# Patient Record
Sex: FEMALE | Race: WHITE | NOT HISPANIC OR LATINO | Employment: OTHER | URBAN - METROPOLITAN AREA
[De-identification: names, ages, dates, MRNs, and addresses within clinical notes are randomized per-mention and may not be internally consistent; named-entity substitution may affect disease eponyms.]

---

## 2017-01-05 ENCOUNTER — ALLSCRIPTS OFFICE VISIT (OUTPATIENT)
Dept: OTHER | Facility: OTHER | Age: 82
End: 2017-01-05

## 2017-01-12 ENCOUNTER — LAB CONVERSION - ENCOUNTER (OUTPATIENT)
Dept: OTHER | Facility: OTHER | Age: 82
End: 2017-01-12

## 2017-01-12 ENCOUNTER — GENERIC CONVERSION - ENCOUNTER (OUTPATIENT)
Dept: OTHER | Facility: OTHER | Age: 82
End: 2017-01-12

## 2017-01-12 LAB
INR PPP: 2.2
PROTHROMBIN TIME: 21.8 SEC (ref 9–11.5)

## 2017-02-03 ENCOUNTER — LAB CONVERSION - ENCOUNTER (OUTPATIENT)
Dept: OTHER | Facility: OTHER | Age: 82
End: 2017-02-03

## 2017-02-03 LAB
25(OH)D3 SERPL-MCNC: 40 NG/ML (ref 30–100)
TSH SERPL DL<=0.05 MIU/L-ACNC: 5.56 MIU/L (ref 0.4–4.5)

## 2017-02-06 ENCOUNTER — GENERIC CONVERSION - ENCOUNTER (OUTPATIENT)
Dept: OTHER | Facility: OTHER | Age: 82
End: 2017-02-06

## 2017-02-08 ENCOUNTER — GENERIC CONVERSION - ENCOUNTER (OUTPATIENT)
Dept: OTHER | Facility: OTHER | Age: 82
End: 2017-02-08

## 2017-02-09 ENCOUNTER — GENERIC CONVERSION - ENCOUNTER (OUTPATIENT)
Dept: OTHER | Facility: OTHER | Age: 82
End: 2017-02-09

## 2017-02-09 ENCOUNTER — LAB CONVERSION - ENCOUNTER (OUTPATIENT)
Dept: OTHER | Facility: OTHER | Age: 82
End: 2017-02-09

## 2017-02-09 LAB
INR PPP: 1.9
PROTHROMBIN TIME: 18.9 SEC (ref 9–11.5)

## 2017-02-13 DIAGNOSIS — J20.9 ACUTE BRONCHITIS: ICD-10-CM

## 2017-03-14 ENCOUNTER — LAB CONVERSION - ENCOUNTER (OUTPATIENT)
Dept: OTHER | Facility: OTHER | Age: 82
End: 2017-03-14

## 2017-03-14 ENCOUNTER — GENERIC CONVERSION - ENCOUNTER (OUTPATIENT)
Dept: OTHER | Facility: OTHER | Age: 82
End: 2017-03-14

## 2017-03-14 LAB
INR PPP: 2.4
PROTHROMBIN TIME: 24.2 SEC (ref 9–11.5)

## 2017-03-16 ENCOUNTER — ALLSCRIPTS OFFICE VISIT (OUTPATIENT)
Dept: OTHER | Facility: OTHER | Age: 82
End: 2017-03-16

## 2017-04-02 ENCOUNTER — HOSPITAL ENCOUNTER (OUTPATIENT)
Dept: RADIOLOGY | Facility: MEDICAL CENTER | Age: 82
Discharge: HOME | End: 2017-04-02
Attending: PHYSICIAN ASSISTANT | Admitting: FAMILY MEDICINE
Payer: COMMERCIAL

## 2017-04-02 ENCOUNTER — TRANSCRIBE ORDERS (OUTPATIENT)
Dept: URGENT CARE | Facility: MEDICAL CENTER | Age: 82
End: 2017-04-02

## 2017-04-02 ENCOUNTER — OFFICE VISIT (OUTPATIENT)
Dept: URGENT CARE | Facility: MEDICAL CENTER | Age: 82
End: 2017-04-02
Payer: COMMERCIAL

## 2017-04-02 DIAGNOSIS — R05.9 COUGH: ICD-10-CM

## 2017-04-02 PROCEDURE — 71020 HB CHEST X-RAY 2VW FRONTAL&LATL: CPT

## 2017-04-02 PROCEDURE — 99203 OFFICE O/P NEW LOW 30 MIN: CPT

## 2017-04-04 ENCOUNTER — ALLSCRIPTS OFFICE VISIT (OUTPATIENT)
Dept: OTHER | Facility: OTHER | Age: 82
End: 2017-04-04

## 2017-04-11 ENCOUNTER — GENERIC CONVERSION - ENCOUNTER (OUTPATIENT)
Dept: OTHER | Facility: OTHER | Age: 82
End: 2017-04-11

## 2017-04-11 ENCOUNTER — LAB CONVERSION - ENCOUNTER (OUTPATIENT)
Dept: OTHER | Facility: OTHER | Age: 82
End: 2017-04-11

## 2017-04-11 LAB
A/G RATIO (HISTORICAL): 1.5 (CALC) (ref 1–2.5)
ALBUMIN SERPL BCP-MCNC: 3.9 G/DL (ref 3.6–5.1)
ALP SERPL-CCNC: 32 U/L (ref 33–130)
ALT SERPL W P-5'-P-CCNC: 15 U/L (ref 6–29)
AST SERPL W P-5'-P-CCNC: 21 U/L (ref 10–35)
BASOPHILS # BLD AUTO: 0.5 %
BASOPHILS # BLD AUTO: 30 CELLS/UL (ref 0–200)
BILIRUB SERPL-MCNC: 0.6 MG/DL (ref 0.2–1.2)
BUN SERPL-MCNC: 24 MG/DL (ref 7–25)
BUN/CREA RATIO (HISTORICAL): 25 (CALC) (ref 6–22)
CALCIUM SERPL-MCNC: 9.3 MG/DL (ref 8.6–10.4)
CHLORIDE SERPL-SCNC: 101 MMOL/L (ref 98–110)
CHOLEST SERPL-MCNC: 123 MG/DL (ref 125–200)
CHOLEST/HDLC SERPL: 2.9 (CALC)
CO2 SERPL-SCNC: 32 MMOL/L (ref 20–31)
CREAT SERPL-MCNC: 0.97 MG/DL (ref 0.6–0.88)
DEPRECATED RDW RBC AUTO: 15.1 % (ref 11–15)
EGFR AFRICAN AMERICAN (HISTORICAL): 61 ML/MIN/1.73M2
EGFR-AMERICAN CALC (HISTORICAL): 53 ML/MIN/1.73M2
EOSINOPHIL # BLD AUTO: 1.3 %
EOSINOPHIL # BLD AUTO: 78 CELLS/UL (ref 15–500)
GAMMA GLOBULIN (HISTORICAL): 2.6 G/DL (CALC) (ref 1.9–3.7)
GLUCOSE (HISTORICAL): 93 MG/DL (ref 65–99)
HBA1C MFR BLD HPLC: 6.1 % OF TOTAL HGB
HCT VFR BLD AUTO: 46.5 % (ref 35–45)
HDLC SERPL-MCNC: 43 MG/DL
HGB BLD-MCNC: 15.1 G/DL (ref 11.7–15.5)
INR PPP: 2.4
LDL CHOLESTEROL (HISTORICAL): 62 MG/DL (CALC)
LYMPHOCYTES # BLD AUTO: 1488 CELLS/UL (ref 850–3900)
LYMPHOCYTES # BLD AUTO: 24.8 %
MCH RBC QN AUTO: 32.1 PG (ref 27–33)
MCHC RBC AUTO-ENTMCNC: 32.4 G/DL (ref 32–36)
MCV RBC AUTO: 99 FL (ref 80–100)
MONOCYTES # BLD AUTO: 432 CELLS/UL (ref 200–950)
MONOCYTES (HISTORICAL): 7.2 %
NEUTROPHILS # BLD AUTO: 3972 CELLS/UL (ref 1500–7800)
NEUTROPHILS # BLD AUTO: 66.2 %
NON-HDL-CHOL (CHOL-HDL) (HISTORICAL): 80 MG/DL (CALC)
PLATELET # BLD AUTO: 366 THOUSAND/UL (ref 140–400)
PMV BLD AUTO: 8.2 FL (ref 7.5–12.5)
POTASSIUM SERPL-SCNC: 5.3 MMOL/L (ref 3.5–5.3)
PROTHROMBIN TIME: 25 SEC (ref 9–11.5)
RBC # BLD AUTO: 4.7 MILLION/UL (ref 3.8–5.1)
SODIUM SERPL-SCNC: 140 MMOL/L (ref 135–146)
TOTAL PROTEIN (HISTORICAL): 6.5 G/DL (ref 6.1–8.1)
TRIGL SERPL-MCNC: 88 MG/DL
WBC # BLD AUTO: 6 THOUSAND/UL (ref 3.8–10.8)

## 2017-04-17 ENCOUNTER — GENERIC CONVERSION - ENCOUNTER (OUTPATIENT)
Dept: OTHER | Facility: OTHER | Age: 82
End: 2017-04-17

## 2017-04-24 ENCOUNTER — ALLSCRIPTS OFFICE VISIT (OUTPATIENT)
Dept: OTHER | Facility: OTHER | Age: 82
End: 2017-04-24

## 2017-05-11 ENCOUNTER — GENERIC CONVERSION - ENCOUNTER (OUTPATIENT)
Dept: OTHER | Facility: OTHER | Age: 82
End: 2017-05-11

## 2017-05-11 ENCOUNTER — LAB CONVERSION - ENCOUNTER (OUTPATIENT)
Dept: OTHER | Facility: OTHER | Age: 82
End: 2017-05-11

## 2017-05-11 LAB
INR PPP: 1.3
PROTHROMBIN TIME: 13.6 SEC (ref 9–11.5)

## 2017-05-19 ENCOUNTER — GENERIC CONVERSION - ENCOUNTER (OUTPATIENT)
Dept: OTHER | Facility: OTHER | Age: 82
End: 2017-05-19

## 2017-05-19 ENCOUNTER — LAB CONVERSION - ENCOUNTER (OUTPATIENT)
Dept: OTHER | Facility: OTHER | Age: 82
End: 2017-05-19

## 2017-05-19 LAB
INR PPP: 1.5
PROTHROMBIN TIME: 15.1 SEC (ref 9–11.5)

## 2017-05-23 ENCOUNTER — ALLSCRIPTS OFFICE VISIT (OUTPATIENT)
Dept: OTHER | Facility: OTHER | Age: 82
End: 2017-05-23

## 2017-06-01 ENCOUNTER — GENERIC CONVERSION - ENCOUNTER (OUTPATIENT)
Dept: OTHER | Facility: OTHER | Age: 82
End: 2017-06-01

## 2017-06-01 ENCOUNTER — LAB CONVERSION - ENCOUNTER (OUTPATIENT)
Dept: OTHER | Facility: OTHER | Age: 82
End: 2017-06-01

## 2017-06-01 LAB
INR PPP: 1.9
PROTHROMBIN TIME: 19 SEC (ref 9–11.5)

## 2017-06-07 ENCOUNTER — ALLSCRIPTS OFFICE VISIT (OUTPATIENT)
Dept: OTHER | Facility: OTHER | Age: 82
End: 2017-06-07

## 2017-06-07 ENCOUNTER — LAB CONVERSION - ENCOUNTER (OUTPATIENT)
Dept: OTHER | Facility: OTHER | Age: 82
End: 2017-06-07

## 2017-06-07 LAB
A/G RATIO (HISTORICAL): 1.7 (CALC) (ref 1–2.5)
ALBUMIN SERPL BCP-MCNC: 4.1 G/DL (ref 3.6–5.1)
ALP SERPL-CCNC: 47 U/L (ref 33–130)
ALT SERPL W P-5'-P-CCNC: 18 U/L (ref 6–29)
AST SERPL W P-5'-P-CCNC: 22 U/L (ref 10–35)
BILIRUB SERPL-MCNC: 0.6 MG/DL (ref 0.2–1.2)
BILIRUBIN DIRECT (HISTORICAL): 0.2 MG/DL
CHOLEST SERPL-MCNC: 136 MG/DL (ref 125–200)
CHOLEST/HDLC SERPL: 2.3 (CALC)
CK SERPL-CCNC: 69 U/L (ref 29–143)
GAMMA GLOBULIN (HISTORICAL): 2.4 G/DL (CALC) (ref 1.9–3.7)
HDLC SERPL-MCNC: 59 MG/DL
INDIRECT BILIRUBIN (HISTORICAL): 0.4 MG/DL (CALC) (ref 0.2–1.2)
LDL CHOLESTEROL (HISTORICAL): 61 MG/DL (CALC)
NON-HDL-CHOL (CHOL-HDL) (HISTORICAL): 77 MG/DL (CALC)
TOTAL PROTEIN (HISTORICAL): 6.5 G/DL (ref 6.1–8.1)
TRIGL SERPL-MCNC: 78 MG/DL

## 2017-06-15 ENCOUNTER — LAB CONVERSION - ENCOUNTER (OUTPATIENT)
Dept: OTHER | Facility: OTHER | Age: 82
End: 2017-06-15

## 2017-06-15 ENCOUNTER — GENERIC CONVERSION - ENCOUNTER (OUTPATIENT)
Dept: OTHER | Facility: OTHER | Age: 82
End: 2017-06-15

## 2017-06-15 LAB
INR PPP: 2.2
PROTHROMBIN TIME: 22.9 SEC (ref 9–11.5)

## 2017-07-12 ENCOUNTER — GENERIC CONVERSION - ENCOUNTER (OUTPATIENT)
Dept: OTHER | Facility: OTHER | Age: 82
End: 2017-07-12

## 2017-07-13 ENCOUNTER — GENERIC CONVERSION - ENCOUNTER (OUTPATIENT)
Dept: OTHER | Facility: OTHER | Age: 82
End: 2017-07-13

## 2017-07-13 ENCOUNTER — LAB CONVERSION - ENCOUNTER (OUTPATIENT)
Dept: OTHER | Facility: OTHER | Age: 82
End: 2017-07-13

## 2017-07-13 LAB
INR PPP: 3
PROTHROMBIN TIME: 30.9 SEC (ref 9–11.5)

## 2017-07-27 ENCOUNTER — GENERIC CONVERSION - ENCOUNTER (OUTPATIENT)
Dept: OTHER | Facility: OTHER | Age: 82
End: 2017-07-27

## 2017-07-27 ENCOUNTER — LAB CONVERSION - ENCOUNTER (OUTPATIENT)
Dept: OTHER | Facility: OTHER | Age: 82
End: 2017-07-27

## 2017-07-27 LAB
INR PPP: 3.3
PROTHROMBIN TIME: 33.2 SEC (ref 9–11.5)

## 2017-08-03 ENCOUNTER — GENERIC CONVERSION - ENCOUNTER (OUTPATIENT)
Dept: OTHER | Facility: OTHER | Age: 82
End: 2017-08-03

## 2017-08-03 ENCOUNTER — LAB CONVERSION - ENCOUNTER (OUTPATIENT)
Dept: OTHER | Facility: OTHER | Age: 82
End: 2017-08-03

## 2017-08-03 LAB
INR PPP: 1.8
PROTHROMBIN TIME: 18.6 SEC (ref 9–11.5)

## 2017-08-09 ENCOUNTER — GENERIC CONVERSION - ENCOUNTER (OUTPATIENT)
Dept: OTHER | Facility: OTHER | Age: 82
End: 2017-08-09

## 2017-08-10 ENCOUNTER — GENERIC CONVERSION - ENCOUNTER (OUTPATIENT)
Dept: OTHER | Facility: OTHER | Age: 82
End: 2017-08-10

## 2017-08-10 LAB
INR PPP: 3.3
PROTHROMBIN TIME: 33.9 SEC (ref 9–11.5)

## 2017-08-12 ENCOUNTER — HOSPITAL ENCOUNTER (INPATIENT)
Facility: HOSPITAL | Age: 82
LOS: 2 days | Discharge: HOME HEALTH | DRG: 315 | End: 2017-08-14
Attending: EMERGENCY MEDICINE | Admitting: GENERAL PRACTICE
Payer: COMMERCIAL

## 2017-08-12 ENCOUNTER — APPOINTMENT (EMERGENCY)
Dept: CT IMAGING | Facility: HOSPITAL | Age: 82
DRG: 315 | End: 2017-08-12
Payer: COMMERCIAL

## 2017-08-12 DIAGNOSIS — R05.8 PRODUCTIVE COUGH: Primary | ICD-10-CM

## 2017-08-12 DIAGNOSIS — I50.9 ACUTE EXACERBATION OF CHF (CONGESTIVE HEART FAILURE) (HCC): ICD-10-CM

## 2017-08-12 DIAGNOSIS — R06.00 DYSPNEA: ICD-10-CM

## 2017-08-12 DIAGNOSIS — R91.1 PULMONARY NODULE: ICD-10-CM

## 2017-08-12 DIAGNOSIS — J44.9 COPD (CHRONIC OBSTRUCTIVE PULMONARY DISEASE) (HCC): ICD-10-CM

## 2017-08-12 DIAGNOSIS — R09.02 HYPOXIA: ICD-10-CM

## 2017-08-12 PROBLEM — E78.5 HLD (HYPERLIPIDEMIA): Status: ACTIVE | Noted: 2017-08-12

## 2017-08-12 PROBLEM — R79.1 SUPRATHERAPEUTIC INR: Status: ACTIVE | Noted: 2017-08-12

## 2017-08-12 PROBLEM — E03.9 HYPOTHYROID: Status: ACTIVE | Noted: 2017-08-12

## 2017-08-12 PROBLEM — Z99.89 CPAP (CONTINUOUS POSITIVE AIRWAY PRESSURE) DEPENDENCE: Status: ACTIVE | Noted: 2017-08-12

## 2017-08-12 LAB
ALBUMIN SERPL BCP-MCNC: 3.6 G/DL (ref 3.5–5)
ALP SERPL-CCNC: 54 U/L (ref 46–116)
ALT SERPL W P-5'-P-CCNC: 22 U/L (ref 12–78)
ANION GAP SERPL CALCULATED.3IONS-SCNC: 5 MMOL/L (ref 4–13)
APTT PPP: 52 SECONDS (ref 23–35)
AST SERPL W P-5'-P-CCNC: 26 U/L (ref 5–45)
BACTERIA UR QL AUTO: ABNORMAL /HPF
BASOPHILS # BLD AUTO: 0.02 THOUSANDS/ΜL (ref 0–0.1)
BASOPHILS NFR BLD AUTO: 0 % (ref 0–1)
BILIRUB SERPL-MCNC: 0.6 MG/DL (ref 0.2–1)
BILIRUB UR QL STRIP: NEGATIVE
BUN SERPL-MCNC: 15 MG/DL (ref 5–25)
CALCIUM SERPL-MCNC: 9.5 MG/DL (ref 8.3–10.1)
CHLORIDE SERPL-SCNC: 100 MMOL/L (ref 100–108)
CLARITY UR: CLEAR
CO2 SERPL-SCNC: 33 MMOL/L (ref 21–32)
COLOR UR: YELLOW
CREAT SERPL-MCNC: 0.85 MG/DL (ref 0.6–1.3)
DIGOXIN SERPL-MCNC: 0.4 NG/ML (ref 0.8–2)
EOSINOPHIL # BLD AUTO: 0.14 THOUSAND/ΜL (ref 0–0.61)
EOSINOPHIL NFR BLD AUTO: 2 % (ref 0–6)
ERYTHROCYTE [DISTWIDTH] IN BLOOD BY AUTOMATED COUNT: 13.3 % (ref 11.6–15.1)
GFR SERPL CREATININE-BSD FRML MDRD: 62 ML/MIN/1.73SQ M
GLUCOSE SERPL-MCNC: 352 MG/DL (ref 65–140)
GLUCOSE SERPL-MCNC: 99 MG/DL (ref 65–140)
GLUCOSE UR STRIP-MCNC: NEGATIVE MG/DL
HCT VFR BLD AUTO: 45.3 % (ref 34.8–46.1)
HGB BLD-MCNC: 15.1 G/DL (ref 11.5–15.4)
HGB UR QL STRIP.AUTO: ABNORMAL
INR PPP: 3.2 (ref 0.86–1.16)
KETONES UR STRIP-MCNC: NEGATIVE MG/DL
LACTATE SERPL-SCNC: 0.9 MMOL/L (ref 0.5–2)
LEUKOCYTE ESTERASE UR QL STRIP: NEGATIVE
LYMPHOCYTES # BLD AUTO: 1.18 THOUSANDS/ΜL (ref 0.6–4.47)
LYMPHOCYTES NFR BLD AUTO: 15 % (ref 14–44)
MAGNESIUM SERPL-MCNC: 1.9 MG/DL (ref 1.6–2.6)
MCH RBC QN AUTO: 31.9 PG (ref 26.8–34.3)
MCHC RBC AUTO-ENTMCNC: 33.3 G/DL (ref 31.4–37.4)
MCV RBC AUTO: 96 FL (ref 82–98)
MONOCYTES # BLD AUTO: 0.75 THOUSAND/ΜL (ref 0.17–1.22)
MONOCYTES NFR BLD AUTO: 10 % (ref 4–12)
NEUTROPHILS # BLD AUTO: 5.79 THOUSANDS/ΜL (ref 1.85–7.62)
NEUTS SEG NFR BLD AUTO: 73 % (ref 43–75)
NITRITE UR QL STRIP: NEGATIVE
NON-SQ EPI CELLS URNS QL MICRO: ABNORMAL /HPF
NRBC BLD AUTO-RTO: 0 /100 WBCS
NT-PROBNP SERPL-MCNC: 2148 PG/ML
PH UR STRIP.AUTO: 5.5 [PH] (ref 4.5–8)
PLATELET # BLD AUTO: 155 THOUSANDS/UL (ref 149–390)
PMV BLD AUTO: 9.9 FL (ref 8.9–12.7)
POTASSIUM SERPL-SCNC: 4.4 MMOL/L (ref 3.5–5.3)
PROT SERPL-MCNC: 7.1 G/DL (ref 6.4–8.2)
PROT UR STRIP-MCNC: NEGATIVE MG/DL
PROTHROMBIN TIME: 33.7 SECONDS (ref 12.1–14.4)
RBC # BLD AUTO: 4.73 MILLION/UL (ref 3.81–5.12)
RBC #/AREA URNS AUTO: ABNORMAL /HPF
SODIUM SERPL-SCNC: 138 MMOL/L (ref 136–145)
SP GR UR STRIP.AUTO: 1.01 (ref 1–1.03)
TROPONIN I SERPL-MCNC: <0.02 NG/ML
TROPONIN I SERPL-MCNC: <0.02 NG/ML
TSH SERPL DL<=0.05 MIU/L-ACNC: 3.05 UIU/ML (ref 0.36–3.74)
UROBILINOGEN UR QL STRIP.AUTO: 0.2 E.U./DL
WBC # BLD AUTO: 7.89 THOUSAND/UL (ref 4.31–10.16)
WBC #/AREA URNS AUTO: ABNORMAL /HPF

## 2017-08-12 PROCEDURE — 84443 ASSAY THYROID STIM HORMONE: CPT | Performed by: EMERGENCY MEDICINE

## 2017-08-12 PROCEDURE — 81001 URINALYSIS AUTO W/SCOPE: CPT | Performed by: EMERGENCY MEDICINE

## 2017-08-12 PROCEDURE — 83880 ASSAY OF NATRIURETIC PEPTIDE: CPT | Performed by: EMERGENCY MEDICINE

## 2017-08-12 PROCEDURE — 80162 ASSAY OF DIGOXIN TOTAL: CPT | Performed by: PHYSICIAN ASSISTANT

## 2017-08-12 PROCEDURE — 96361 HYDRATE IV INFUSION ADD-ON: CPT

## 2017-08-12 PROCEDURE — 93005 ELECTROCARDIOGRAM TRACING: CPT | Performed by: EMERGENCY MEDICINE

## 2017-08-12 PROCEDURE — 36415 COLL VENOUS BLD VENIPUNCTURE: CPT | Performed by: EMERGENCY MEDICINE

## 2017-08-12 PROCEDURE — 85610 PROTHROMBIN TIME: CPT | Performed by: EMERGENCY MEDICINE

## 2017-08-12 PROCEDURE — 85025 COMPLETE CBC W/AUTO DIFF WBC: CPT | Performed by: EMERGENCY MEDICINE

## 2017-08-12 PROCEDURE — 96374 THER/PROPH/DIAG INJ IV PUSH: CPT

## 2017-08-12 PROCEDURE — 80177 DRUG SCRN QUAN LEVETIRACETAM: CPT | Performed by: PHYSICIAN ASSISTANT

## 2017-08-12 PROCEDURE — 71275 CT ANGIOGRAPHY CHEST: CPT

## 2017-08-12 PROCEDURE — 80053 COMPREHEN METABOLIC PANEL: CPT | Performed by: EMERGENCY MEDICINE

## 2017-08-12 PROCEDURE — 83735 ASSAY OF MAGNESIUM: CPT | Performed by: EMERGENCY MEDICINE

## 2017-08-12 PROCEDURE — 83605 ASSAY OF LACTIC ACID: CPT | Performed by: EMERGENCY MEDICINE

## 2017-08-12 PROCEDURE — 82948 REAGENT STRIP/BLOOD GLUCOSE: CPT

## 2017-08-12 PROCEDURE — 85730 THROMBOPLASTIN TIME PARTIAL: CPT | Performed by: EMERGENCY MEDICINE

## 2017-08-12 PROCEDURE — 84484 ASSAY OF TROPONIN QUANT: CPT | Performed by: PHYSICIAN ASSISTANT

## 2017-08-12 PROCEDURE — 94760 N-INVAS EAR/PLS OXIMETRY 1: CPT

## 2017-08-12 PROCEDURE — 99285 EMERGENCY DEPT VISIT HI MDM: CPT

## 2017-08-12 PROCEDURE — 84484 ASSAY OF TROPONIN QUANT: CPT | Performed by: EMERGENCY MEDICINE

## 2017-08-12 PROCEDURE — 94660 CPAP INITIATION&MGMT: CPT

## 2017-08-12 RX ORDER — FENOFIBRATE 145 MG/1
145 TABLET, COATED ORAL DAILY
Status: DISCONTINUED | OUTPATIENT
Start: 2017-08-13 | End: 2017-08-14 | Stop reason: HOSPADM

## 2017-08-12 RX ORDER — METHYLPREDNISOLONE SODIUM SUCCINATE 125 MG/2ML
125 INJECTION, POWDER, LYOPHILIZED, FOR SOLUTION INTRAMUSCULAR; INTRAVENOUS ONCE
Status: COMPLETED | OUTPATIENT
Start: 2017-08-12 | End: 2017-08-12

## 2017-08-12 RX ORDER — ALBUTEROL SULFATE 90 UG/1
2 AEROSOL, METERED RESPIRATORY (INHALATION) EVERY 6 HOURS PRN
COMMUNITY

## 2017-08-12 RX ORDER — IBUPROFEN 200 MG
1 CAPSULE ORAL DAILY
COMMUNITY
End: 2018-02-20

## 2017-08-12 RX ORDER — ACETAMINOPHEN 80 MG/1
500 TABLET, CHEWABLE ORAL 2 TIMES DAILY
Status: DISCONTINUED | OUTPATIENT
Start: 2017-08-13 | End: 2017-08-12 | Stop reason: CLARIF

## 2017-08-12 RX ORDER — ONDANSETRON 2 MG/ML
4 INJECTION INTRAMUSCULAR; INTRAVENOUS EVERY 6 HOURS PRN
Status: DISCONTINUED | OUTPATIENT
Start: 2017-08-12 | End: 2017-08-14 | Stop reason: HOSPADM

## 2017-08-12 RX ORDER — FUROSEMIDE 10 MG/ML
40 INJECTION INTRAMUSCULAR; INTRAVENOUS 2 TIMES DAILY
Status: DISCONTINUED | OUTPATIENT
Start: 2017-08-13 | End: 2017-08-13

## 2017-08-12 RX ORDER — PRAVASTATIN SODIUM 80 MG/1
80 TABLET ORAL DAILY
Status: DISCONTINUED | OUTPATIENT
Start: 2017-08-13 | End: 2017-08-14 | Stop reason: HOSPADM

## 2017-08-12 RX ORDER — CALCIUM CARBONATE 200(500)MG
1000 TABLET,CHEWABLE ORAL DAILY PRN
Status: DISCONTINUED | OUTPATIENT
Start: 2017-08-12 | End: 2017-08-14 | Stop reason: HOSPADM

## 2017-08-12 RX ORDER — TRAZODONE HYDROCHLORIDE 50 MG/1
50 TABLET ORAL
Status: DISCONTINUED | OUTPATIENT
Start: 2017-08-12 | End: 2017-08-14 | Stop reason: HOSPADM

## 2017-08-12 RX ORDER — ENALAPRILAT 2.5 MG/2ML
0.62 INJECTION INTRAVENOUS EVERY 6 HOURS PRN
Status: DISCONTINUED | OUTPATIENT
Start: 2017-08-12 | End: 2017-08-14 | Stop reason: HOSPADM

## 2017-08-12 RX ORDER — LOSARTAN POTASSIUM 50 MG/1
50 TABLET ORAL DAILY
Status: DISCONTINUED | OUTPATIENT
Start: 2017-08-13 | End: 2017-08-14 | Stop reason: HOSPADM

## 2017-08-12 RX ORDER — GUAIFENESIN 600 MG
1200 TABLET, EXTENDED RELEASE 12 HR ORAL EVERY 12 HOURS SCHEDULED
Status: DISCONTINUED | OUTPATIENT
Start: 2017-08-12 | End: 2017-08-14 | Stop reason: HOSPADM

## 2017-08-12 RX ORDER — LEVETIRACETAM 500 MG/1
500 TABLET ORAL EVERY 12 HOURS
Status: DISCONTINUED | OUTPATIENT
Start: 2017-08-12 | End: 2017-08-14 | Stop reason: HOSPADM

## 2017-08-12 RX ORDER — ALBUTEROL SULFATE 90 UG/1
2 AEROSOL, METERED RESPIRATORY (INHALATION) EVERY 6 HOURS PRN
Status: DISCONTINUED | OUTPATIENT
Start: 2017-08-12 | End: 2017-08-14 | Stop reason: HOSPADM

## 2017-08-12 RX ORDER — GUAIFENESIN 600 MG
1200 TABLET, EXTENDED RELEASE 12 HR ORAL AS NEEDED
COMMUNITY

## 2017-08-12 RX ORDER — ACETAMINOPHEN 160 MG/5ML
1000 SUSPENSION, ORAL (FINAL DOSE FORM) ORAL 2 TIMES DAILY
Status: DISCONTINUED | OUTPATIENT
Start: 2017-08-13 | End: 2017-08-14 | Stop reason: HOSPADM

## 2017-08-12 RX ORDER — ALBUTEROL SULFATE 2.5 MG/3ML
5 SOLUTION RESPIRATORY (INHALATION) ONCE
Status: COMPLETED | OUTPATIENT
Start: 2017-08-12 | End: 2017-08-12

## 2017-08-12 RX ORDER — DIGOXIN 125 MCG
125 TABLET ORAL DAILY
Status: DISCONTINUED | OUTPATIENT
Start: 2017-08-13 | End: 2017-08-14 | Stop reason: HOSPADM

## 2017-08-12 RX ADMIN — IOHEXOL 85 ML: 350 INJECTION, SOLUTION INTRAVENOUS at 18:31

## 2017-08-12 RX ADMIN — ALBUTEROL SULFATE 5 MG: 2.5 SOLUTION RESPIRATORY (INHALATION) at 16:58

## 2017-08-12 RX ADMIN — SODIUM CHLORIDE 500 ML: 0.9 INJECTION, SOLUTION INTRAVENOUS at 17:33

## 2017-08-12 RX ADMIN — GUAIFENESIN 1200 MG: 600 TABLET, EXTENDED RELEASE ORAL at 23:41

## 2017-08-12 RX ADMIN — INSULIN LISPRO 4 UNITS: 100 INJECTION, SOLUTION INTRAVENOUS; SUBCUTANEOUS at 23:46

## 2017-08-12 RX ADMIN — LEVETIRACETAM 500 MG: 500 TABLET ORAL at 23:41

## 2017-08-12 RX ADMIN — IPRATROPIUM BROMIDE 0.5 MG: 0.5 SOLUTION RESPIRATORY (INHALATION) at 16:58

## 2017-08-12 RX ADMIN — METHYLPREDNISOLONE SODIUM SUCCINATE 125 MG: 125 INJECTION, POWDER, FOR SOLUTION INTRAMUSCULAR; INTRAVENOUS at 17:34

## 2017-08-12 RX ADMIN — TRAZODONE HYDROCHLORIDE 50 MG: 50 TABLET ORAL at 23:41

## 2017-08-13 LAB
ANION GAP SERPL CALCULATED.3IONS-SCNC: 10 MMOL/L (ref 4–13)
APTT PPP: 42 SECONDS (ref 23–35)
BUN SERPL-MCNC: 13 MG/DL (ref 5–25)
CALCIUM SERPL-MCNC: 9 MG/DL (ref 8.3–10.1)
CHLORIDE SERPL-SCNC: 102 MMOL/L (ref 100–108)
CHOLEST SERPL-MCNC: 145 MG/DL (ref 50–200)
CO2 SERPL-SCNC: 30 MMOL/L (ref 21–32)
CREAT SERPL-MCNC: 0.81 MG/DL (ref 0.6–1.3)
DIGOXIN SERPL-MCNC: 0.5 NG/ML (ref 0.8–2)
ERYTHROCYTE [DISTWIDTH] IN BLOOD BY AUTOMATED COUNT: 13.5 % (ref 11.6–15.1)
EST. AVERAGE GLUCOSE BLD GHB EST-MCNC: 134 MG/DL
GFR SERPL CREATININE-BSD FRML MDRD: 66 ML/MIN/1.73SQ M
GLUCOSE SERPL-MCNC: 103 MG/DL (ref 65–140)
GLUCOSE SERPL-MCNC: 104 MG/DL (ref 65–140)
GLUCOSE SERPL-MCNC: 106 MG/DL (ref 65–140)
GLUCOSE SERPL-MCNC: 184 MG/DL (ref 65–140)
GLUCOSE SERPL-MCNC: 184 MG/DL (ref 65–140)
HBA1C MFR BLD: 6.3 % (ref 4.2–6.3)
HCT VFR BLD AUTO: 45.6 % (ref 34.8–46.1)
HDLC SERPL-MCNC: 67 MG/DL (ref 40–60)
HGB BLD-MCNC: 15.3 G/DL (ref 11.5–15.4)
INR PPP: 2.51 (ref 0.86–1.16)
LDLC SERPL CALC-MCNC: 67 MG/DL (ref 0–100)
MAGNESIUM SERPL-MCNC: 1.9 MG/DL (ref 1.6–2.6)
MCH RBC QN AUTO: 32.3 PG (ref 26.8–34.3)
MCHC RBC AUTO-ENTMCNC: 33.6 G/DL (ref 31.4–37.4)
MCV RBC AUTO: 96 FL (ref 82–98)
PLATELET # BLD AUTO: 155 THOUSANDS/UL (ref 149–390)
PMV BLD AUTO: 10.2 FL (ref 8.9–12.7)
POTASSIUM SERPL-SCNC: 4.1 MMOL/L (ref 3.5–5.3)
PROTHROMBIN TIME: 27.8 SECONDS (ref 12.1–14.4)
RBC # BLD AUTO: 4.73 MILLION/UL (ref 3.81–5.12)
SODIUM SERPL-SCNC: 142 MMOL/L (ref 136–145)
TRIGL SERPL-MCNC: 54 MG/DL
TROPONIN I SERPL-MCNC: 0.02 NG/ML
TROPONIN I SERPL-MCNC: <0.02 NG/ML
WBC # BLD AUTO: 6.24 THOUSAND/UL (ref 4.31–10.16)

## 2017-08-13 PROCEDURE — 94760 N-INVAS EAR/PLS OXIMETRY 1: CPT

## 2017-08-13 PROCEDURE — 85027 COMPLETE CBC AUTOMATED: CPT | Performed by: PHYSICIAN ASSISTANT

## 2017-08-13 PROCEDURE — 80061 LIPID PANEL: CPT | Performed by: PHYSICIAN ASSISTANT

## 2017-08-13 PROCEDURE — 80162 ASSAY OF DIGOXIN TOTAL: CPT | Performed by: INTERNAL MEDICINE

## 2017-08-13 PROCEDURE — 85610 PROTHROMBIN TIME: CPT | Performed by: PHYSICIAN ASSISTANT

## 2017-08-13 PROCEDURE — 82948 REAGENT STRIP/BLOOD GLUCOSE: CPT

## 2017-08-13 PROCEDURE — 85730 THROMBOPLASTIN TIME PARTIAL: CPT | Performed by: PHYSICIAN ASSISTANT

## 2017-08-13 PROCEDURE — 80048 BASIC METABOLIC PNL TOTAL CA: CPT | Performed by: PHYSICIAN ASSISTANT

## 2017-08-13 PROCEDURE — 84484 ASSAY OF TROPONIN QUANT: CPT | Performed by: PHYSICIAN ASSISTANT

## 2017-08-13 PROCEDURE — 83735 ASSAY OF MAGNESIUM: CPT | Performed by: PHYSICIAN ASSISTANT

## 2017-08-13 PROCEDURE — 83036 HEMOGLOBIN GLYCOSYLATED A1C: CPT | Performed by: PHYSICIAN ASSISTANT

## 2017-08-13 RX ORDER — FUROSEMIDE 10 MG/ML
20 INJECTION INTRAMUSCULAR; INTRAVENOUS 2 TIMES DAILY
Status: DISCONTINUED | OUTPATIENT
Start: 2017-08-13 | End: 2017-08-14 | Stop reason: HOSPADM

## 2017-08-13 RX ORDER — WARFARIN SODIUM 3 MG/1
3 TABLET ORAL
Status: DISCONTINUED | OUTPATIENT
Start: 2017-08-13 | End: 2017-08-14 | Stop reason: HOSPADM

## 2017-08-13 RX ADMIN — LEVETIRACETAM 500 MG: 500 TABLET ORAL at 08:43

## 2017-08-13 RX ADMIN — GUAIFENESIN 1200 MG: 600 TABLET, EXTENDED RELEASE ORAL at 21:31

## 2017-08-13 RX ADMIN — FENOFIBRATE 145 MG: 145 TABLET, FILM COATED ORAL at 08:42

## 2017-08-13 RX ADMIN — TIOTROPIUM BROMIDE 18 MCG: 18 CAPSULE ORAL; RESPIRATORY (INHALATION) at 08:43

## 2017-08-13 RX ADMIN — FUROSEMIDE 20 MG: 10 INJECTION, SOLUTION INTRAMUSCULAR; INTRAVENOUS at 17:40

## 2017-08-13 RX ADMIN — LEVETIRACETAM 500 MG: 500 TABLET ORAL at 23:12

## 2017-08-13 RX ADMIN — ACETAMINOPHEN 1000 MG: 160 SUSPENSION ORAL at 17:40

## 2017-08-13 RX ADMIN — METOPROLOL TARTRATE 12.5 MG: 25 TABLET ORAL at 17:40

## 2017-08-13 RX ADMIN — FUROSEMIDE 40 MG: 10 INJECTION, SOLUTION INTRAMUSCULAR; INTRAVENOUS at 08:41

## 2017-08-13 RX ADMIN — METOPROLOL TARTRATE 12.5 MG: 25 TABLET ORAL at 08:43

## 2017-08-13 RX ADMIN — LOSARTAN POTASSIUM 50 MG: 50 TABLET, FILM COATED ORAL at 08:42

## 2017-08-13 RX ADMIN — PRAVASTATIN SODIUM 80 MG: 80 TABLET ORAL at 08:42

## 2017-08-13 RX ADMIN — DIGOXIN 125 MCG: 0.12 TABLET ORAL at 08:42

## 2017-08-13 RX ADMIN — ACETAMINOPHEN 1000 MG: 160 SUSPENSION ORAL at 08:41

## 2017-08-13 RX ADMIN — WARFARIN SODIUM 3 MG: 3 TABLET ORAL at 17:41

## 2017-08-13 RX ADMIN — TRAZODONE HYDROCHLORIDE 50 MG: 50 TABLET ORAL at 21:31

## 2017-08-13 RX ADMIN — INSULIN LISPRO 1 UNITS: 100 INJECTION, SOLUTION INTRAVENOUS; SUBCUTANEOUS at 08:41

## 2017-08-13 RX ADMIN — GUAIFENESIN 1200 MG: 600 TABLET, EXTENDED RELEASE ORAL at 08:42

## 2017-08-14 ENCOUNTER — APPOINTMENT (INPATIENT)
Dept: NON INVASIVE DIAGNOSTICS | Facility: HOSPITAL | Age: 82
DRG: 315 | End: 2017-08-14
Payer: COMMERCIAL

## 2017-08-14 VITALS
DIASTOLIC BLOOD PRESSURE: 60 MMHG | HEIGHT: 64 IN | WEIGHT: 151.2 LBS | BODY MASS INDEX: 25.81 KG/M2 | OXYGEN SATURATION: 93 % | SYSTOLIC BLOOD PRESSURE: 137 MMHG | HEART RATE: 51 BPM | RESPIRATION RATE: 16 BRPM | TEMPERATURE: 97.4 F

## 2017-08-14 LAB
ANION GAP SERPL CALCULATED.3IONS-SCNC: 2 MMOL/L (ref 4–13)
ATRIAL RATE: 340 BPM
BUN SERPL-MCNC: 23 MG/DL (ref 5–25)
CALCIUM SERPL-MCNC: 8.4 MG/DL (ref 8.3–10.1)
CHLORIDE SERPL-SCNC: 103 MMOL/L (ref 100–108)
CO2 SERPL-SCNC: 36 MMOL/L (ref 21–32)
CREAT SERPL-MCNC: 0.76 MG/DL (ref 0.6–1.3)
GFR SERPL CREATININE-BSD FRML MDRD: 71 ML/MIN/1.73SQ M
GLUCOSE SERPL-MCNC: 104 MG/DL (ref 65–140)
GLUCOSE SERPL-MCNC: 108 MG/DL (ref 65–140)
GLUCOSE SERPL-MCNC: 97 MG/DL (ref 65–140)
INR PPP: 3.23 (ref 0.86–1.16)
POTASSIUM SERPL-SCNC: 4.2 MMOL/L (ref 3.5–5.3)
PROTHROMBIN TIME: 34 SECONDS (ref 12.1–14.4)
QRS AXIS: -30 DEGREES
QRSD INTERVAL: 84 MS
QT INTERVAL: 412 MS
QTC INTERVAL: 425 MS
SODIUM SERPL-SCNC: 141 MMOL/L (ref 136–145)
T WAVE AXIS: 83 DEGREES
VENTRICULAR RATE: 64 BPM

## 2017-08-14 PROCEDURE — 80048 BASIC METABOLIC PNL TOTAL CA: CPT | Performed by: FAMILY MEDICINE

## 2017-08-14 PROCEDURE — 97165 OT EVAL LOW COMPLEX 30 MIN: CPT

## 2017-08-14 PROCEDURE — G8987 SELF CARE CURRENT STATUS: HCPCS

## 2017-08-14 PROCEDURE — G8989 SELF CARE D/C STATUS: HCPCS

## 2017-08-14 PROCEDURE — 97163 PT EVAL HIGH COMPLEX 45 MIN: CPT

## 2017-08-14 PROCEDURE — 85610 PROTHROMBIN TIME: CPT | Performed by: FAMILY MEDICINE

## 2017-08-14 PROCEDURE — 93306 TTE W/DOPPLER COMPLETE: CPT

## 2017-08-14 PROCEDURE — G8979 MOBILITY GOAL STATUS: HCPCS

## 2017-08-14 PROCEDURE — G8988 SELF CARE GOAL STATUS: HCPCS

## 2017-08-14 PROCEDURE — G8978 MOBILITY CURRENT STATUS: HCPCS

## 2017-08-14 PROCEDURE — 94760 N-INVAS EAR/PLS OXIMETRY 1: CPT

## 2017-08-14 PROCEDURE — 82948 REAGENT STRIP/BLOOD GLUCOSE: CPT

## 2017-08-14 RX ORDER — AZITHROMYCIN 250 MG/1
250 TABLET, FILM COATED ORAL EVERY 24 HOURS
Qty: 4 TABLET | Refills: 0 | Status: SHIPPED | OUTPATIENT
Start: 2017-08-15 | End: 2017-08-19

## 2017-08-14 RX ORDER — AZITHROMYCIN 250 MG/1
250 TABLET, FILM COATED ORAL EVERY 24 HOURS
Status: DISCONTINUED | OUTPATIENT
Start: 2017-08-14 | End: 2017-08-14 | Stop reason: HOSPADM

## 2017-08-14 RX ORDER — LOSARTAN POTASSIUM 50 MG/1
50 TABLET ORAL DAILY
Qty: 30 TABLET | Refills: 0
Start: 2017-08-14 | End: 2018-02-17 | Stop reason: SDUPTHER

## 2017-08-14 RX ADMIN — LEVETIRACETAM 500 MG: 500 TABLET ORAL at 13:05

## 2017-08-14 RX ADMIN — FENOFIBRATE 145 MG: 145 TABLET, FILM COATED ORAL at 08:24

## 2017-08-14 RX ADMIN — PRAVASTATIN SODIUM 80 MG: 80 TABLET ORAL at 08:24

## 2017-08-14 RX ADMIN — DIGOXIN 125 MCG: 0.12 TABLET ORAL at 08:24

## 2017-08-14 RX ADMIN — METOPROLOL TARTRATE 12.5 MG: 25 TABLET ORAL at 08:24

## 2017-08-14 RX ADMIN — GUAIFENESIN 1200 MG: 600 TABLET, EXTENDED RELEASE ORAL at 08:24

## 2017-08-14 RX ADMIN — LOSARTAN POTASSIUM 50 MG: 50 TABLET, FILM COATED ORAL at 08:24

## 2017-08-14 RX ADMIN — FUROSEMIDE 20 MG: 10 INJECTION, SOLUTION INTRAMUSCULAR; INTRAVENOUS at 08:24

## 2017-08-14 RX ADMIN — AZITHROMYCIN 250 MG: 250 TABLET, FILM COATED ORAL at 13:05

## 2017-08-16 LAB — LEVETIRACETAM SERPL-MCNC: 16 UG/ML (ref 10–40)

## 2017-08-17 ENCOUNTER — LAB CONVERSION - ENCOUNTER (OUTPATIENT)
Dept: OTHER | Facility: OTHER | Age: 82
End: 2017-08-17

## 2017-08-17 ENCOUNTER — ALLSCRIPTS OFFICE VISIT (OUTPATIENT)
Dept: OTHER | Facility: OTHER | Age: 82
End: 2017-08-17

## 2017-08-17 LAB
INR PPP: 1.3
PROTHROMBIN TIME: 13.3 SEC (ref 9–11.5)

## 2017-08-18 ENCOUNTER — GENERIC CONVERSION - ENCOUNTER (OUTPATIENT)
Dept: OTHER | Facility: OTHER | Age: 82
End: 2017-08-18

## 2017-08-22 ENCOUNTER — GENERIC CONVERSION - ENCOUNTER (OUTPATIENT)
Dept: OTHER | Facility: OTHER | Age: 82
End: 2017-08-22

## 2017-08-24 ENCOUNTER — GENERIC CONVERSION - ENCOUNTER (OUTPATIENT)
Dept: OTHER | Facility: OTHER | Age: 82
End: 2017-08-24

## 2017-08-24 ENCOUNTER — LAB CONVERSION - ENCOUNTER (OUTPATIENT)
Dept: OTHER | Facility: OTHER | Age: 82
End: 2017-08-24

## 2017-08-24 LAB
INR PPP: 2.2
PROTHROMBIN TIME: 22.5 SEC (ref 9–11.5)

## 2017-09-08 ENCOUNTER — GENERIC CONVERSION - ENCOUNTER (OUTPATIENT)
Dept: OTHER | Facility: OTHER | Age: 82
End: 2017-09-08

## 2017-09-12 ENCOUNTER — GENERIC CONVERSION - ENCOUNTER (OUTPATIENT)
Dept: OTHER | Facility: OTHER | Age: 82
End: 2017-09-12

## 2017-09-21 ENCOUNTER — GENERIC CONVERSION - ENCOUNTER (OUTPATIENT)
Dept: OTHER | Facility: OTHER | Age: 82
End: 2017-09-21

## 2017-09-21 ENCOUNTER — LAB CONVERSION - ENCOUNTER (OUTPATIENT)
Dept: OTHER | Facility: OTHER | Age: 82
End: 2017-09-21

## 2017-09-21 LAB
INR PPP: 2
PROTHROMBIN TIME: 20 SEC (ref 9–11.5)

## 2017-09-28 ENCOUNTER — ALLSCRIPTS OFFICE VISIT (OUTPATIENT)
Dept: OTHER | Facility: OTHER | Age: 82
End: 2017-09-28

## 2017-10-20 ENCOUNTER — GENERIC CONVERSION - ENCOUNTER (OUTPATIENT)
Dept: OTHER | Facility: OTHER | Age: 82
End: 2017-10-20

## 2017-10-20 ENCOUNTER — LAB CONVERSION - ENCOUNTER (OUTPATIENT)
Dept: OTHER | Facility: OTHER | Age: 82
End: 2017-10-20

## 2017-10-20 LAB
INR PPP: 2.4
PROTHROMBIN TIME: 24.1 SEC (ref 9–11.5)

## 2017-11-16 ENCOUNTER — GENERIC CONVERSION - ENCOUNTER (OUTPATIENT)
Dept: OTHER | Facility: OTHER | Age: 82
End: 2017-11-16

## 2017-11-16 ENCOUNTER — LAB CONVERSION - ENCOUNTER (OUTPATIENT)
Dept: OTHER | Facility: OTHER | Age: 82
End: 2017-11-16

## 2017-11-16 LAB
INR PPP: 2.4
PROTHROMBIN TIME: 24.5 SEC (ref 9–11.5)

## 2017-11-20 DIAGNOSIS — Z00.00 ENCOUNTER FOR GENERAL ADULT MEDICAL EXAMINATION WITHOUT ABNORMAL FINDINGS: ICD-10-CM

## 2017-11-20 DIAGNOSIS — E04.1 NONTOXIC SINGLE THYROID NODULE: ICD-10-CM

## 2017-11-20 DIAGNOSIS — I10 ESSENTIAL (PRIMARY) HYPERTENSION: ICD-10-CM

## 2017-11-20 DIAGNOSIS — E78.2 MIXED HYPERLIPIDEMIA: ICD-10-CM

## 2017-11-20 DIAGNOSIS — K76.0 FATTY (CHANGE OF) LIVER, NOT ELSEWHERE CLASSIFIED: ICD-10-CM

## 2017-11-20 DIAGNOSIS — E11.9 TYPE 2 DIABETES MELLITUS WITHOUT COMPLICATIONS (HCC): ICD-10-CM

## 2017-11-20 DIAGNOSIS — R94.6 ABNORMAL RESULTS OF THYROID FUNCTION STUDIES: ICD-10-CM

## 2017-11-20 DIAGNOSIS — I48.0 PAROXYSMAL ATRIAL FIBRILLATION (HCC): ICD-10-CM

## 2017-11-20 DIAGNOSIS — I50.32 CHRONIC DIASTOLIC HEART FAILURE (HCC): ICD-10-CM

## 2017-11-20 DIAGNOSIS — J44.9 CHRONIC OBSTRUCTIVE PULMONARY DISEASE (HCC): ICD-10-CM

## 2017-11-20 DIAGNOSIS — C34.91 MALIGNANT NEOPLASM OF UNSPECIFIED PART OF RIGHT BRONCHUS OR LUNG (HCC): ICD-10-CM

## 2017-11-20 DIAGNOSIS — I27.29 OTHER SECONDARY PULMONARY HYPERTENSION (HCC): ICD-10-CM

## 2017-11-29 ENCOUNTER — LAB CONVERSION - ENCOUNTER (OUTPATIENT)
Dept: OTHER | Facility: OTHER | Age: 82
End: 2017-11-29

## 2017-11-29 LAB
A/G RATIO (HISTORICAL): 1.8 (CALC) (ref 1–2.5)
ALBUMIN SERPL BCP-MCNC: 4.4 G/DL (ref 3.6–5.1)
ALP SERPL-CCNC: 48 U/L (ref 33–130)
ALT SERPL W P-5'-P-CCNC: 14 U/L (ref 6–29)
AST SERPL W P-5'-P-CCNC: 20 U/L (ref 10–35)
BASOPHILS # BLD AUTO: 0.4 %
BASOPHILS # BLD AUTO: 27 CELLS/UL (ref 0–200)
BILIRUB SERPL-MCNC: 0.7 MG/DL (ref 0.2–1.2)
BUN SERPL-MCNC: 19 MG/DL (ref 7–25)
BUN/CREA RATIO (HISTORICAL): NORMAL (CALC) (ref 6–22)
CALCIUM SERPL-MCNC: 9.3 MG/DL (ref 8.6–10.4)
CHLORIDE SERPL-SCNC: 99 MMOL/L (ref 98–110)
CHOLEST SERPL-MCNC: 155 MG/DL
CHOLEST/HDLC SERPL: 2.5 (CALC)
CO2 SERPL-SCNC: 31 MMOL/L (ref 20–31)
CREAT SERPL-MCNC: 0.85 MG/DL (ref 0.6–0.88)
CREATININE, RANDOM URINE (HISTORICAL): 82 MG/DL (ref 20–320)
DEPRECATED RDW RBC AUTO: 13 % (ref 11–15)
EGFR AFRICAN AMERICAN (HISTORICAL): 71 ML/MIN/1.73M2
EGFR-AMERICAN CALC (HISTORICAL): 62 ML/MIN/1.73M2
EOSINOPHIL # BLD AUTO: 1.2 %
EOSINOPHIL # BLD AUTO: 80 CELLS/UL (ref 15–500)
GAMMA GLOBULIN (HISTORICAL): 2.4 G/DL (CALC) (ref 1.9–3.7)
GLUCOSE (HISTORICAL): 92 MG/DL (ref 65–99)
HBA1C MFR BLD HPLC: 5.5 % OF TOTAL HGB
HCT VFR BLD AUTO: 47.1 % (ref 35–45)
HDLC SERPL-MCNC: 62 MG/DL
HGB BLD-MCNC: 16 G/DL (ref 11.7–15.5)
LDL CHOLESTEROL (HISTORICAL): 73 MG/DL (CALC)
LYMPHOCYTES # BLD AUTO: 1588 CELLS/UL (ref 850–3900)
LYMPHOCYTES # BLD AUTO: 23.7 %
MAGNESIUM, UR (HISTORICAL): 6 MG/DL
MCH RBC QN AUTO: 33.3 PG (ref 27–33)
MCHC RBC AUTO-ENTMCNC: 34 G/DL (ref 32–36)
MCV RBC AUTO: 97.9 FL (ref 80–100)
MICROALBUMIN/CREATININE RATIO (HISTORICAL): 73 MCG/MG CREAT
MONOCYTES # BLD AUTO: 543 CELLS/UL (ref 200–950)
MONOCYTES (HISTORICAL): 8.1 %
NEUTROPHILS # BLD AUTO: 4462 CELLS/UL (ref 1500–7800)
NEUTROPHILS # BLD AUTO: 66.6 %
NON-HDL-CHOL (CHOL-HDL) (HISTORICAL): 93 MG/DL (CALC)
PLATELET # BLD AUTO: 174 THOUSAND/UL (ref 140–400)
PMV BLD AUTO: 10.9 FL (ref 7.5–12.5)
POTASSIUM SERPL-SCNC: 4.2 MMOL/L (ref 3.5–5.3)
RBC # BLD AUTO: 4.81 MILLION/UL (ref 3.8–5.1)
SODIUM SERPL-SCNC: 140 MMOL/L (ref 135–146)
T4 FREE SERPL-MCNC: 1.2 NG/DL (ref 0.8–1.8)
TOTAL PROTEIN (HISTORICAL): 6.8 G/DL (ref 6.1–8.1)
TRIGL SERPL-MCNC: 115 MG/DL
TSH SERPL DL<=0.05 MIU/L-ACNC: 5.82 MIU/L (ref 0.4–4.5)
WBC # BLD AUTO: 6.7 THOUSAND/UL (ref 3.8–10.8)

## 2017-11-30 ENCOUNTER — HOSPITAL ENCOUNTER (OUTPATIENT)
Dept: RADIOLOGY | Facility: MEDICAL CENTER | Age: 82
Discharge: HOME | End: 2017-11-30
Payer: COMMERCIAL

## 2017-11-30 DIAGNOSIS — C34.91 MALIGNANT NEOPLASM OF UNSPECIFIED PART OF RIGHT BRONCHUS OR LUNG (HCC): ICD-10-CM

## 2017-11-30 PROCEDURE — 71250 CT THORAX DX C-: CPT

## 2017-12-04 ENCOUNTER — ALLSCRIPTS OFFICE VISIT (OUTPATIENT)
Dept: OTHER | Facility: OTHER | Age: 82
End: 2017-12-04

## 2017-12-14 ENCOUNTER — LAB CONVERSION - ENCOUNTER (OUTPATIENT)
Dept: OTHER | Facility: OTHER | Age: 82
End: 2017-12-14

## 2017-12-14 ENCOUNTER — GENERIC CONVERSION - ENCOUNTER (OUTPATIENT)
Dept: OTHER | Facility: OTHER | Age: 82
End: 2017-12-14

## 2017-12-14 LAB
INR PPP: 1.7
PROTHROMBIN TIME: 17.8 SEC (ref 9–11.5)

## 2017-12-15 ENCOUNTER — GENERIC CONVERSION - ENCOUNTER (OUTPATIENT)
Dept: OTHER | Facility: OTHER | Age: 82
End: 2017-12-15

## 2017-12-21 ENCOUNTER — GENERIC CONVERSION - ENCOUNTER (OUTPATIENT)
Dept: OTHER | Facility: OTHER | Age: 82
End: 2017-12-21

## 2017-12-21 ENCOUNTER — LAB CONVERSION - ENCOUNTER (OUTPATIENT)
Dept: OTHER | Facility: OTHER | Age: 82
End: 2017-12-21

## 2017-12-21 LAB
INR PPP: 2.1
PROTHROMBIN TIME: 21 SEC (ref 9–11.5)

## 2018-01-09 NOTE — RESULT NOTES
Message   stable      Verified Results  US THYROID [id number] 09:41AM Rosalio Rater     Test Name Result Flag Reference   US THYROID (Report)     THYROID ULTRASOUND     INDICATION: 80year-old with history of RIGHT thyroidectomy  Follow-up LEFT nodules     COMPARISON: 2/1/2016 and multiple more remote priors  TECHNIQUE:  Ultrasound of the thyroid was performed with a high frequency linear transducer in transverse and sagittal planes including volumetric imaging sweeps as well as traditional still imaging technique  FINDINGS:   Normal homogeneous smooth echotexture  Right gland: Prior RIGHT thyroidectomy  Thyroidectomy bed within normal limits  Left gland: 5 9 x 2 9 x 3 5 cm  LEFT upper pole measuring 2 5 x 2 1 x 1 8 cm  Solid isoechoic nodule with coarse central calcification  Unchanged from prior    This nodule has been biopsied twice previously in 2011 and 2013  Mid to lower pole measuring 3 4 x 2 5 x 2 6 cm  Solid with central cystic components and coarse calcification  Unchanged from prior compared to recent priors  Decreased in size compared to 2012  Isthmus: 0 5 cm in AP dimension  No dominant nodules  IMPRESSION:      Prior RIGHT thyroidectomy  2 dominant LEFT thyroid nodules without suspicious changes         Workstation performed: [id number]     Signed by:   Iglesia Salcido MD   11/30/16

## 2018-01-09 NOTE — PROGRESS NOTES
History of Present Illness  Care Coordination Encounter Information:   Type of Encounter: Telephonic   Contact: Initial Contact    Spoke to Adult Child   Nick Rosa  Care Coordination  Nurse Kendrick Lot:   The reason for call is to discuss outreach for follow up/needed services  Doing good  Lives at home with her daughter  Discharged from home PT yesterday  [facility] still coming  Next visit today  Denies complaints of increased shortness of breath, edema, cough or weakness  Weighs self daily and keeps log  Weight today 150 4 pounds  Discussed monitoring for weight changes and signs and symptoms to monitor for  Denies any falls  Uses cane  All prescriptions filled  Denies any questions or concerns a this time  Encouraged to call if needed  Care Coordination Chronic Condition HPI:       Patient is experiencing the following symptoms: shortness of breath with moderate activity, but no fatigue, no cough, no chest pain and no edema  Knowledge Assessment/Teachback Questions: is obtaining daily weights, understands the activity/exercise plan and knows when to report symptoms  Counseling was provided to patient's family  Topics counseled included need for daily weights, activities of daily living, medications, activity/exercise, patient and family education, importance of compliance with treatment and symptoms to report  Active Problems    1  Acute exacerbation of chronic obstructive pulmonary disease (COPD) (491 21) (J44 1)   2  Acute on chronic diastolic congestive heart failure (428 33,428 0) (I50 33)   3  Acute URI (465 9) (J06 9)   4  Advanced directives, counseling/discussion (V65 49) (Z71 89)   5  Anticoagulant long-term use (V58 61) (Z79 01)   6  Chronic diastolic congestive heart failure (428 32,428 0) (I50 32)   7  CT Lung Pulmonary Nodule (< 6cm )   8  Depression (311) (F32 9)   9  Elevated TSH (794 5) (R94 6)   10  Fatty liver (571 8) (K76 0)   11  Hypertension (401 9) (I10)   12  Hypoxia (799 02) (R09 02)   13  Insomnia (780 52) (G47 00)   14  Lung nodule (793 11) (R91 1)   15  Mitral regurgitation (424 0) (I34 0)   16  Mixed hyperlipidemia (272 2) (E78 2)   17  Mixed simple and mucopurulent chronic bronchitis (491 1) (J41 8)   18  Non-toxic multinodular goiter (241 1) (E04 2)   19  Nontoxic single thyroid nodule (241 0) (E04 1)   20  Obstructive sleep apnea on CPAP (327 23,V46 8) (G47 33,Z99 89)   21  Osteoporosis (733 00) (M81 0)   22  Paroxysmal atrial fibrillation (427 31) (I48 0)   23  Pulmonary hypertension (416 8) (I27 2)   24  Pulmonary nodules (793 19) (R91 8)   25  Screening for genitourinary condition (V81 6) (Z13 89)   26  Seizure disorder (345 90) (G40 909)   27  Squamous cell carcinoma of right lung (162 9) (C34 91)   28  Type 2 diabetes mellitus (250 00) (E11 9)    Past Medical History    1  History of Acute on chronic diastolic congestive heart failure (428 33,428 0) (I50 33)   2  History of Acute tracheobronchitis (466 0) (J20 9)   3  History of Cataract, right eye (366 9) (H26 9)   4  History of Epistaxis (784 7) (R04 0)   5  History of actinic keratosis (V13 3) (Z87 2)   6  History of anemia (V12 3) (Z86 2)   7  History of stroke without residual deficits (V12 54) (Z86 73)   8  History of Pneumonia (486) (J18 9)   9  History of Pulmonary Embolism (V12 51)   10  History of Trochanteric bursitis of left hip (726 5) (M70 62)    Surgical History    1  History of Cholecystectomy   2  History of Craniotomy (Therapeutic)   3  History of Thyroid Surgery Sub-Total Thyroidectomy   4  History of Tonsillectomy   5  History of Treatment Of Hip Fracture    Family History  Mother    1  Family history of Diabetes Mellitus (V18 0)   2  Family history of Reported Family History Of Heart Disease  Father    3  Family history of Cancer  Family History    4  Family history of cerebrovascular accident (V17 1) (Z82 3)   5  Family history of hypertension (V17 49) (Z82 49)   6   Family history of sudden cardiac death (SCD) (V17 41) (Z82 41)    Social History    · Denied: Alcohol use   · Denied: Drug use (305 90) (F19 90)   · Former smoker (V15 82) (H35 894)   · Single    Current Meds    1  Albuterol Sulfate (2 5 MG/3ML) 0 083% Inhalation Nebulization Solution; USE 1 UNIT   DOSE IN NEBULIZER 4 TIMES DAILY; Therapy: 80Ubq6298 to (Last Artice Prows)  Requested for: 92Ncl8033 Ordered    2  Furosemide 40 MG Oral Tablet; TAKE ONE TABLET BY MOUTH ONCE DAILY; Therapy: 34BAX2591 to )  Requested for: 72Zbx2275; Last   Rx:62Qpb3860; Status: [address] to Pharmacy - Awaiting Verification Ordered   3  Pravastatin Sodium 80 MG Oral Tablet; TAKE ONE TABLET BY MOUTH ONCE DAILY; Therapy: 49JAC4367 to (Evaluate:12Nov2017)  Requested for: 98Pdx6212; Last   Rx:39Oer2445 Ordered    4  Os-Vaibhav TABS; Take 1 tablet twice daily; Therapy: (Recorded:19Jun2013) to Recorded    5  Losartan Potassium 50 MG Oral Tablet; TAKE ONE TABLET BY MOUTH ONCE DAILY; Therapy: 72HJB4409 to (Mine Espinoza)  Requested for: 58SRB0189; Last   Rx:89Nbo6379 Ordered   6  Metoprolol Tartrate 25 MG Oral Tablet; TAKE ONE-HALF TABLET BY MOUTH TWICE   DAILY; Therapy: 20Hov1516 to (Evaluate:11Jan2018)  Requested for: 98KQM8738; Last   Rx:16Jan2017 Ordered    7  TraZODone HCl - 50 MG Oral Tablet; Take 1 tablet by mouth at bedtime; Therapy: 56QBU1541 to (Jayne Najjar)  Requested for: 96BLS0554; Last   Rx:67Wik1189 Ordered    8  Ventolin  (90 Base) MCG/ACT Inhalation Aerosol Solution; 2 puffs 4 times a day   as needed; Therapy: 47PNC2170 to (Evaluate:21Jan2017)  Requested for: 26IOI7322; Last   Rx:23Wpj7222 Ordered    9  Digoxin 125 MCG Oral Tablet; take one tablet by mouth every other day; Therapy: 91EDS5361 to )  Requested for: 77Wck1839; Last   Rx:18Mbp7932 Ordered   10  Warfarin Sodium 3 MG Oral Tablet; TAKE ONE TABLET BY MOUTH ONCE DAILY;     Therapy: 06DRF5704 to (Guille Bolanos) Requested for: 28Nov2016; Last    Rx:28Nov2016 Ordered   11  Warfarin Sodium 5 MG Oral Tablet; TAKE ONE TABLET BY MOUTH ONCE DAILY AS    DIRECTED; Therapy: 92NBL6551 to (Lakeshia Mangcarol ann)  Requested for: 10Sep2017; Last    Rx:98Rvb4051; Status: ACTIVE - Transmit to Pharmacy - Awaiting Verification Ordered    12  LevETIRAcetam 500 MG Oral Tablet; take one tablet by mouth twice daily; Therapy: 45KER3815 to (Evaluate:09Jvg2786)  Requested for: 53Noq0257; Last    Rx:72Aul2607 Ordered    13  MetFORMIN HCl - 500 MG Oral Tablet; take one tablet by mouth twice daily; Therapy: 49YKE3638 to ([phone])  Requested for: 23Oct2016; Last    Rx:23Oct2016 Ordered    14  Acetaminophen 500 MG Oral Tablet; TAKE 1 TABLET EVERY 4 TO 6 HOURS AS    NEEDED; Therapy: (Recorded:54Dmd9913) to Recorded   15  Calcium 600 + D TABS; TAKE 1 TABLET DAILY; Therapy: (Recorded:03Qtu6013) to Recorded   16  Spiriva Respimat AERS; INHALE 2 PUFFS BY MOUTH DAILY; Therapy: (Recorded:96Twc9074) to Recorded    Allergies    1  Penicillins    Health Management   Complete PFT; every 1 year; Next Due: 51PXT1497; Overdue   *VB - Eye Exam; every 1 year; Last 39WZU4454; Next Due: 02PIX8390; Overdue   BREAST EXAM; every 1 year; Next Due: 39Pen5822; Overdue  Medicare Annual Wellness Visit; every 1 year; Next Due: 65JGZ3078; Overdue    End of Encounter Meds    1  Albuterol Sulfate (2 5 MG/3ML) 0 083% Inhalation Nebulization Solution; USE 1 UNIT   DOSE IN NEBULIZER 4 TIMES DAILY; Therapy: 20Pyp8059 to (Last Leia Bunn)  Requested for: 22Aug2017 Ordered    2  Furosemide 40 MG Oral Tablet; TAKE ONE TABLET BY MOUTH ONCE DAILY; Therapy: 50KNF2496 to Holmes Schilder)  Requested for: 95Fee5859; Last   Rx:52Vnu1414; Status: [address] to Pharmacy - Awaiting Verification Ordered   3  Pravastatin Sodium 80 MG Oral Tablet; TAKE ONE TABLET BY MOUTH ONCE DAILY;    Therapy: 01FYV4589 to (Evaluate:12Nov2017)  Requested for: 66Plo2876; Last   Rx:34Bwl0658 Ordered    4  Os-Vaibhav TABS; Take 1 tablet twice daily; Therapy: (Recorded:19Jun2013) to Recorded    5  Losartan Potassium 50 MG Oral Tablet; TAKE ONE TABLET BY MOUTH ONCE DAILY; Therapy: 78YXK0018 to (Janne Sacks)  Requested for: 07NMM3072; Last   Rx:10Qkf9269 Ordered   6  Metoprolol Tartrate 25 MG Oral Tablet; TAKE ONE-HALF TABLET BY MOUTH TWICE   DAILY; Therapy: 46Kaa3026 to (Evaluate:11Jan2018)  Requested for: 63HYG7328; Last   Rx:16Jan2017 Ordered    7  TraZODone HCl - 50 MG Oral Tablet; Take 1 tablet by mouth at bedtime; Therapy: 01MOU9513 to ([phone])  Requested for: 39BYB4411; Last   Rx:10Oct2012 Ordered    8  Ventolin  (90 Base) MCG/ACT Inhalation Aerosol Solution; 2 puffs 4 times a day   as needed; Therapy: 48QCK0999 to (Evaluate:21Jan2017)  Requested for: 54CYS9735; Last   Rx:81Hog3834 Ordered    9  Digoxin 125 MCG Oral Tablet; take one tablet by mouth every other day; Therapy: 12LBZ9280 to Bam Gerber)  Requested for: 72Xfz6700; Last   Rx:55Zmp3409 Ordered   10  Warfarin Sodium 3 MG Oral Tablet; TAKE ONE TABLET BY MOUTH ONCE DAILY; Therapy: 21HNR5943 to (Laura Larose)  Requested for: 89LII1869; Last    Rx:28Nov2016 Ordered   11  Warfarin Sodium 5 MG Oral Tablet; TAKE ONE TABLET BY MOUTH ONCE DAILY AS    DIRECTED; Therapy: 10IXJ2161 to (Nargis Dunn)  Requested for: 44Rtu1625; Last    Rx:55Oxf0409; Status: ACTIVE - Transmit to Pharmacy - Awaiting Verification Ordered    12  LevETIRAcetam 500 MG Oral Tablet; take one tablet by mouth twice daily; Therapy: 17GEX1013 to (Evaluate:20Iym9718)  Requested for: 32Xrd9029; Last    Rx:94Qjy3400 Ordered    13  MetFORMIN HCl - 500 MG Oral Tablet; take one tablet by mouth twice daily; Therapy: 54WIX3925 to [phone])  Requested for: 87Asx2065; Last    Rx:23Oct2016 Ordered    14  Acetaminophen 500 MG Oral Tablet; TAKE 1 TABLET EVERY 4 TO 6 HOURS AS    NEEDED;     Therapy: (Troy Huston) to Recorded   15  Calcium 600 + D TABS; TAKE 1 TABLET DAILY; Therapy: (Recorded:62Wuc2766) to Recorded   16  Spiriva Respimat AERS; INHALE 2 PUFFS BY MOUTH DAILY; Therapy: (Recorded:87Wpw3543) to Recorded    Future Appointments    Date/Time Provider Specialty Site   12/04/2017 10:45 AM MARYCARMEN Winn  Thoracic Surgery [facility]AC[facility]   02/20/2018 10:40 AM MARYCARMEN Multani  Endocrinology [facility] ENDOCRINOLOGY   12/15/2017 10:00 AM Marisol Cavazos [facility] Pulmonary Medicine [facility] PULMONARY ASSOC Jenramon Trevino   05/22/2018 10:00 AM MARYCARMEN Woods  Pulmonary Medicine [facility] PULMONARY [facility] Jenramon Trevino   09/28/2017 10:40 AM Steph Farris MD Cardiology [facility] CARDIOLOGY [place]   12/04/2017 01:00 PM MARYCARMEN Davis  Family Medicine [address]     Message   Recorded as Task   Date: 09/06/2017 02:27 PM, Created By: Myron Stein   Task Name: Call Back   Assigned To: Myron Stein   Regarding Patient: Julio Saeed, Status: In Progress   CommentParisa Frias - 06 Sep 2017 2:27 PM     TASK CREATED  Called patient to assess any needs or concerns  There was no answer  Left message with my contact information to return call  Myron Stein - 06 Sep 2017 2:27 PM     TASK IN PROGRESS   Myron Stein - 12 Sep 2017 10:32 AM     TASK EDITED  Second attempt made to contact patient  See CC note  Patient Care Team    Care Team Member Role Specialty Office Number   Robbie Lopez MD Specialist Cardiology [phone]   Eligio Bee MD  Thoracic Surgery [phone]   Ethan BRODERICK  Family Medicine [phone]   Koko BRODERICK  Endocrinology [phone]   Margo BRODERICK    Pulmonary Medicine [phone]     Signatures   Electronically signed by : Ting Mcdaniels; Sep 12 2017 10:45AM EST                       (Author)

## 2018-01-09 NOTE — RESULT NOTES
Verified Results  (1) PT WITH INR [id number] 08:20AM Fatuma Davila   REPORT COMMENT:  FASTING:YES     Test Name Result Flag Reference   INR 2 4 H    Reference Range                     0 9-1 1  Moderate-intensity Warfarin Therapy 2 0-3 0  Higher-intensity Warfarin Therapy   3 0-4 0   PT 25 0 sec H 9 0-11 5   For more information on this test, go to:  http://[employer]/faq/ITZ605

## 2018-01-09 NOTE — RESULT NOTES
Message   bome mineral density improved , continue calcium and vitamin d - will hold off on reclast     thyroid nodules stable, will monitor      Verified Results  [address] [place] [address] [id number] 09:47AM Ailyn Zaragoza Order Number: [id number]     Test Name Result Flag Reference   US THYROID (Report)     THYROID ULTRASOUND     INDICATION: Follow-up thyroid nodules     COMPARISON: 1/23/2015     TECHNIQUE:  Ultrasound of the thyroid was performed with a high frequency linear transducer in transverse and sagittal planes including volumetric imaging sweeps as well as traditional still imaging technique  FINDINGS:     The patient is status post right hemithyroidectomy  The thyroidectomy bed is normal      Left gland: 2 8 x 2 7 x 6 1 cm  There is a heterogeneous left upper pole nodule with calcification measuring 1 8 x 1 7 x 2 4 cm, previously 1 9 x 1 8 x 2 5 cm  There is a mixed cystic and solid left mid to lower pole nodule measuring 2 8 x 3 1 x 3 3 cm, previously 2 9 x 3 4 x 3 4 cm  IMPRESSION:      Stable left-sided thyroid nodules  Signed by:   Jody Diego MD   2/1/16     * DXA BONE DENSITY SPINE HIP AND PELVIS [id number] 09:46AM Ailyn Zaragoza Order Number: [id number]     Test Name Result Flag Reference   DXA BONE DENSITY SPINE HIP AND PELVIS (Report)     CENTRAL DXA SCAN     CLINICAL HISTORY:  80year old post-menopausal  female  Osteoporosis  Risk factors include hypothyroidism with long-term thyroid medication  TECHNIQUE: Bone densitometry was performed using a Horizon A bone densitometer  Regions of interest appear properly placed  There are no obvious fractures or other confounding variables which could limit the study          COMPARISON: 11/7/2013     RESULTS:    LUMBAR SPINE: L1-L4:   BMD 1 024 gm/cm2   T-score -0 2   Z-score 2 7     LEFT TOTAL HIP:   BMD 0 849 gm/cm2   T-score -0 8   Z-score 1 6     LEFT FEMORAL NECK:   BMD 0 708 gm/cm2   T-score -1 3   Z-score 1 3 ASSESSMENT:   1  Based on the WHO classification, the T-score of -1 3 is consistent with low bone mineral density  2  Since the prior study, there has been 3 3% increase in the BMD of the spine, and no statistically significant change in the BMD of the hip  3  Any secondary causes of low bone mineral density should be excluded prior to treatment, if clinically indicated  4  A daily intake of at least 1200 mg calcium and 800 - 1000 IU of Vitamin D, as well as weight bearing and muscle strengthening exercise, fall prevention and avoidance of tobacco and excessive alcohol intake as basic preventive measures are suggested  5  Repeat DXA scan in 18-24 months as clinically indicated           WHO CLASSIFICATION:   Normal (a T-score of -1 0 or higher)   Low bone mineral density (a T-score of less than -1 0 but higher than -2 5)   Osteoporosis (a T-score of -2 5 or less)   Severe osteoporosis (a T-score of -2 5 or less with a fragility fracture)

## 2018-01-10 NOTE — RESULT NOTES
Verified Results  (1) PT WITH INR [id number] 09:09AM Fatuma Davila   REPORT COMMENT:  FASTING:UNKNOWN     Test Name Result Flag Reference   INR 2 1 H    Reference Range                     0 9-1 1  Moderate-intensity Warfarin Therapy 2 0-3 0  Higher-intensity Warfarin Therapy   3 0-4 0   PT 21 6 sec H 9 0-11 5   For more information on this test, go to:  http://Myla/faq/[email or web address]

## 2018-01-10 NOTE — RESULT NOTES
Verified Results  (1) PT WITH INR [id number] 09:57AM James Lazar     Test Name Result Flag Reference   INR 1 8 H    Reference Range                     0 9-1 1  Moderate-intensity Warfarin Therapy 2 0-3 0  Higher-intensity Warfarin Therapy   3 0-4 0   PT 18 6 sec H 9 0-11 5   For more information on this test, go to:  http://[employer]onext/faq/[email or web address]

## 2018-01-10 NOTE — RESULT NOTES
Verified Results  (1) PT WITH INR 20Sep2017 03:23PM Ruth Lehman     Test Name Result Flag Reference   INR 2 0 H    Reference Range                     0 9-1 1  Moderate-intensity Warfarin Therapy 2 0-3 0  Higher-intensity Warfarin Therapy   3 0-4 0   PT 20 0 sec H 9 0-11 5   For more information on this test, go to:  [email or web address]/[email or web address]

## 2018-01-11 NOTE — RESULT NOTES
Verified Results  (1) PT WITH INR [id number] 10:49AM Mer Knee     Test Name Result Flag Reference   INR 2 4 H    Reference Range                     0 9-1 1  Moderate-intensity Warfarin Therapy 2 0-3 0  Higher-intensity Warfarin Therapy   3 0-4 0   PT 24 5 sec H 9 0-11 5   For more information on this test, go to:  http://[employer]/faq/QZA831

## 2018-01-12 NOTE — RESULT NOTES
Verified Results  (1) PT WITH INR [id number] 08:51AM Amado Clause   REPORT COMMENT:  FASTING:UNKNOWN     Test Name Result Flag Reference   INR 2 0 H    Reference Range                     0 9-1 1  Moderate-intensity Warfarin Therapy 2 0-3 0  Higher-intensity Warfarin Therapy   3 0-4 0   PT 20 4 sec H 9 0-11 5   For more information on this test, go to:  [email or web address]/[email or web address]

## 2018-01-12 NOTE — RESULT NOTES
Message   cr slightly higher than  before , she is on metformin - being managed by pcp - i would suggest stopping that , please discuss with pcp     Verified Results  (1) T4, FREE [id number] 12:57PM Elia Rivera     Test Name Result Flag Reference   T4, FREE 1 0 ng/dL  0 8-1 8     (1) BASIC METABOLIC PROFILE [id number] 12:57PM Elia Rivera     Test Name Result Flag Reference   GLUCOSE 101 mg/dL H 65-99   Fasting reference interval   UREA NITROGEN (BUN) 24 mg/dL  7-25   CREATININE 1 22 mg/dL H 0 60-0 88   For patients >52years of age, the reference limit  for Creatinine is approximately 13% higher for people  identified as -American  eGFR NON-AFR  AMERICAN 40 mL/min/1 73m2 L > OR = 60   eGFR AFRICAN AMERICAN 46 mL/min/1 73m2 L > OR = 60   BUN/CREATININE RATIO 20 (calc)  6-22   SODIUM 143 mmol/L  135-146   POTASSIUM 4 4 mmol/L  3 5-5 3   CHLORIDE 102 mmol/L     CARBON DIOXIDE 31 mmol/L H 19-30   CALCIUM 9 6 mg/dL  8 6-10 4     (Q) TSH, 3RD GENERATION [id number] 12:57PM Eila Rivera     Test Name Result Flag Reference   TSH 2 90 mIU/L  0 40-4 50     *(Q) VITAMIN D, 25-HYDROXY, LC/MS/MS [id number] 12:57PM Elia Rivera   REPORT COMMENT:  FASTING:UNKNOWN     Test Name Result Flag Reference   VITAMIN D, 25-OH, TOTAL 38 ng/mL     Vitamin D Status         25-OH Vitamin D:     Deficiency:                    <20 ng/mL  Insufficiency:             20 - 29 ng/mL  Optimal:                 > or = 30 ng/mL     For 25-OH Vitamin D testing on patients on   D2-supplementation and patients for whom quantitation   of D2 and D3 fractions is required, the QuestAssureD(TM)  25-OH VIT D, (D2,D3), LC/MS/MS is recommended: order   code 20478 (patients >2yrs)  For more information on this test, go to:  http://[email or web address]/EBS068  (This link is being provided for   informational/educational purposes only )

## 2018-01-12 NOTE — RESULT NOTES
Verified Results  (1) PT WITH INR [id number] 10:12AM Garry Standard     Test Name Result Flag Reference   INR 3 3 H    Reference Range                     0 9-1 1  Moderate-intensity Warfarin Therapy 2 0-3 0  Higher-intensity Warfarin Therapy   3 0-4 0   PT 33 9 sec H 9 0-11 5   For more information on this test, go to:  http://[employer]/faq/WNZ716

## 2018-01-12 NOTE — RESULT NOTES
PFT Results v2:   Diagnosis/Reason For Study: COPD   Referring Provider: Dr Kaylah Heaton   Spirometry: Forced vital capacity: 2 44L and 110% Predicted Values  Forced expiratory volume in one second: 1 31L and 80% Predicted Value  FEV1/FVC ratio is 74% Predicted Values  F EF 25-75 percent, 0 54 L, 53% predicted    Post Bronchodilator Spirometry: Forced vital capacity : 2 48L and 112% Predicted Values  Forced expiratory volume in one second : 1 35L and 83% Predicted Value  FEV1/FVC ratio is 75% Predicted Values  F EF 25-75 percent, 0 55 L, 54% predicted    Lung Volumes: Total lung capacity : 4 72L and 95% Predicted Values  RV: 90% Predicted Values  RV/T% Predicted Values  DLCO:   DLCO 39% Predicted Values  DLCO corrected for volume is 51%     PFT Interpretation:   Patient had a full lung function testing with spirometry lung volumes and DLCO  Patient gave a good effort  Results meet the ATS standards for acceptability and repeatability  The flow volume curve is normal   There is moderate obstructive ventilatory limitation with no appreciable response to the bronchodilator  The lung volumes are normal   The DLCO is severely decreased  Findings are consistent with COPD  Disproportionate decrease in the DLCO possibly secondary to pulmonary hypertension  Her FEV1 and DLCO have not changed since 2011 PFT  Future Appointments    Date/Time Provider Specialty Site   2017 10:40 AM MARYCARMEN Donaldson  Endocrinology [facility] ENDOCRINOLOGY   2017 11:15 AM MARYCARMEN Cavanaugh  Pulmonary Medicine [facility]   2017 10:30 AM MARYCARMEN Hernandez   Family Medicine [address]   2016 10:30 AM Aggie Alonso MD Thoracic Surgery CT SURGERY [facility] OFFICE      Electronically signed by : MARYCARMEN Morataya ; 2016  4:50PM EST                       (Author)

## 2018-01-12 NOTE — PROGRESS NOTES
History of Present Illness  Care Coordination Encounter Information:   Type of Encounter: Telephonic   Contact: Initial Contact    Spoke to Patient and Adult Child  Care Coordination SL Nurse Hasrh Woodall:   The reason for call is to discuss outreach for follow up/needed services  Patient discharged from [facility] on 9/13/16  Patient had a [facility] appointment with Dr Aye Coronel on 9/22/16  Called patient today to assess progress and address any questions or concerns  Spoke with Angievish Hortensia, daughter, and patient via speaker phone  Lois Padilla states that she is doing well since being discharged  She denies any complaints of shortness of breath, fever, or edema to bilateral lower legs  Lois Padilla does complain of a productive cough with yellow sputum  Lois Padilla does have supplemental home oxygen, however she only uses it as needed  As per Jesse Bazan, they have a pulse ox machine at home that they use to monitor oxygen levels  She states that they range from 92%-93% and increase to 95%-96% after a few deep breaths  Lois Padilla does weigh herself daily and today her weight was 159 2 pounds  Reminded patient and daughter of her upcoming appointment with pulmonary on 10/31/16  Medications reviewed  Patient and daughter offer no questions or concerns and are encouraged to call myself or the office if needed  Care Coordination Chronic Condition HPI:       Patient is experiencing the following symptoms: cough, but no chest pain and no edema  Knowledge Assessment/Teachback Questions: is obtaining daily weights, knows the name of her medications/water pill, understands the activity/exercise plan and knows when to report symptoms  Counseling was provided to the patient and patient's family  Topics counseled included need for daily weights, medications, activity/exercise, patient and family education, importance of compliance with treatment and symptoms to report       COPD Symptoms: cough, able to expectorate sputum and that is yellow, but no dyspnea and no fever  Counseling and Topics Reviewed: symptoms to report and importance of compliance and follow up  Active Problems    1  Acute exacerbation of chronic obstructive pulmonary disease (COPD) (491 21) (J44 1)   2  Acute tracheobronchitis (466 0) (J20 9)   3  Chronic diastolic congestive heart failure (428 32,428 0) (I50 32)   4  Chronic obstructive pulmonary disease (496) (J44 9)   5  CT Lung Pulmonary Nodule (< 6cm )   6  Depression (311) (F32 9)   7  Discoloration of skin of lower leg (709 00) (L81 9)   8  Dyslipidemia (272 4) (E78 5)   9  Edema (782 3) (R60 9)   10  Fatty liver (571 8) (K76 0)   11  Hypertension (401 9) (I10)   12  Insomnia (780 52) (G47 00)   13  Mitral regurgitation (424 0) (I34 0)   14  Mixed hyperlipidemia (272 2) (E78 2)   15  Need for prophylactic vaccination and inoculation against influenza (V04 81) (Z23)   16  Non-toxic multinodular goiter (241 1) (E04 2)   17  Nontoxic single thyroid nodule (241 0) (E04 1)   18  Obstructive sleep apnea (327 23) (G47 33)   19  Osteoporosis (733 00) (M81 0)   20  Paroxysmal atrial fibrillation (427 31) (I48 0)   21  Pulmonary hypertension (416 8) (I27 2)   22  Pulmonary nodules (793 19) (R91 8)   23  Right hip pain (719 45) (M25 551)   24  Seizure disorder (345 90) (G40 909)   25  Squamous cell carcinoma of right lung (162 9) (C34 91)   26  Type 2 diabetes mellitus (250 00) (E11 9)    Past Medical History    1  History of Acute on chronic diastolic congestive heart failure (428 33,428 0) (I50 33)   2  History of Cataract, right eye (366 9) (H26 9)   3  History of Epistaxis (784 7) (R04 0)   4  History of actinic keratosis (V13 3) (Z87 2)   5  History of anemia (V12 3) (Z86 2)   6  History of stroke without residual deficits (V12 54) (Z86 73)   7  History of Lung Cancer (V10 11)   8  History of Pneumonia (486) (J18 9)   9  History of Pulmonary Embolism (V12 51)   10   History of Trochanteric bursitis of left hip (726 5) (M70 62)    Surgical History    1  History of Cholecystectomy   2  History of Craniotomy (Therapeutic)   3  History of Thyroid Surgery Sub-Total Thyroidectomy   4  History of Tonsillectomy   5  History of Treatment Of Hip Fracture    Family History  Mother    1  Family history of Diabetes Mellitus (V18 0)   2  Family history of Reported Family History Of Heart Disease  Father    3  Family history of Cancer  Family History    4  Family history of cerebrovascular accident (V17 1) (Z82 3)   5  Family history of hypertension (V17 49) (Z82 49)   6  Family history of sudden cardiac death (SCD) (V17 41) (Z82 41)    Social History    · Denied: Alcohol use   · Denied: Drug use (305 90) (F19 90)   · Former smoker (V15 82) (C95 252)   · Single    Current Meds    1  Furosemide 40 MG Oral Tablet; TAKE ONE TABLET BY MOUTH ONCE DAILY; Therapy: 35AEB9922 to (Evaluate:17Hxp8193)  Requested for: 12Zsp9066; Last   Rx:15Jvh9397 Ordered    2  Ventolin  (90 Base) MCG/ACT Inhalation Aerosol Solution; 2 puffs 4 times a day   as needed; Therapy: 59MHQ9673 to (Evaluate:21Jan2017)  Requested for: 60DXU6531; Last   Rx:24Rzi6819 Ordered    3  Pravastatin Sodium 80 MG Oral Tablet; TAKE 1 TABLET DAILY; Therapy: 43RVT8722 to (Evaluate:29Apr2017)  Requested for: 53VHO3819; Last   QY:22AWE1416 Ordered    4  Os-Vaibhav TABS; Take 1 tablet twice daily; Therapy: (Recorded:19Jun2013) to Recorded    5  Losartan Potassium 50 MG Oral Tablet; TAKE ONE TABLET BY MOUTH ONCE DAILY; Therapy: 78PEB9498 to (Socorro Weaver)  Requested for: 58Hqh2095; Last   Rx:49Wkd6086 Ordered   6  Metoprolol Tartrate 25 MG Oral Tablet; take 1/2  tablet twice a day; Therapy: 01BVA3260 to (Evaluate:76Oyw8486)  Requested for: 67Rna2572; Last   Rx:51Whw5759 Ordered    7  TraZODone HCl - 50 MG Oral Tablet; Take 1 tablet by mouth at bedtime; Therapy: 63LTM2085 to (Lori Damico)  Requested for: 64FOC5941; Last   Rx:10Oct2012 Ordered    8   Fenofibrate 145 MG Oral Tablet (Tricor); TAKE 1 TABLET BY MOUTH ONCE DAILY; Therapy: 44FAQ7278 to (Evaluate:06Jan2017)  Requested for: 83GDM7011; Last   Rx:36Bfw2098 Ordered    9  Digox 125 MCG Oral Tablet; TAKE 1 TABLET BY MOUTH     EVERY OTHER DAY; Therapy: 90Zms1898 to (Michelle Khan)  Requested for: 50GJT6812; Last   Rx:10Qnk8495 Ordered   10  Digoxin 125 MCG Oral Tablet; take one tablet by mouth every other day; Therapy: 50OWI4889 to (Evaluate:07Apr2017)  Requested for: 84STA1945; Last    Rx:77Bbd1656 Ordered   11  Warfarin Sodium 3 MG Oral Tablet; take 1 tablet by mouth once daily; Therapy: 76DAD6000 to (Michelle Khan)  Requested for: 57AEP9669; Last    Rx:09Kuv5838 Ordered    12  LevETIRAcetam 500 MG Oral Tablet; take one tablet by mouth twice daily; Therapy: 75STB3518 to (Evaluate:61Pgo0532)  Requested for: 01HRK5904; Last    AZ:99MEK2700 Ordered    13  MetFORMIN HCl - 500 MG Oral Tablet; TAKE 1 TABLET TWICE DAILY  Requested for:    81BMF4823; Last Rx:61Sew7195 Ordered    14  Acetaminophen 500 MG Oral Tablet; TAKE 1 TABLET EVERY 4 TO 6 HOURS AS    NEEDED; Therapy: (Recorded:26Wpj4354) to Recorded   15  Calcium 600 + D TABS; TAKE 1 TABLET DAILY; Therapy: (Recorded:26Esb8809) to Recorded   16  GuaiFENesin TABS; TAKE 1 TABLET EVERY 4 HOURS AS NEEDED; Therapy: (Recorded:16Jex7413) to Recorded   17  Spiriva Respimat AERS; INHALE 2 PUFFS BY MOUTH DAILY; Therapy: (Recorded:28Anh5013) to Recorded    Allergies    1  Penicillins    Health Management   Complete PFT; every 1 year; Next Due: 37JNF0634; Overdue   *VB - Eye Exam; every 1 year; Last 46RUM2618; Next Due: 19TTO3215; Overdue   BREAST EXAM; every 1 year; Next Due: 18Gdb7998; Overdue  Medicare Annual Wellness Visit; every 1 year; Next Due: 00WTJ5951; Overdue    End of Encounter Meds    1  Furosemide 40 MG Oral Tablet; TAKE ONE TABLET BY MOUTH ONCE DAILY;    Therapy: 52RDU1219 to (Evaluate:27Xxc6314)  Requested for: 13Ozz2729; Last   Matthew Melo Ordered    2  Ventolin  (90 Base) MCG/ACT Inhalation Aerosol Solution; 2 puffs 4 times a day   as needed; Therapy: 88UCK6363 to (Evaluate:2017)  Requested for: 09HBR9298; Last   Rx:48Jxp0333 Ordered    3  Pravastatin Sodium 80 MG Oral Tablet; TAKE 1 TABLET DAILY; Therapy: 54GAD7479 to (Evaluate:2017)  Requested for: 50INZ5049; Last   G00DHV5345 Ordered    4  Os-Vaibhav TABS; Take 1 tablet twice daily; Therapy: (Recorded:2013) to Recorded    5  Losartan Potassium 50 MG Oral Tablet; TAKE ONE TABLET BY MOUTH ONCE DAILY; Therapy: 44HRZ3448 to (Ketty Antu)  Requested for: 50Qas6987; Last   Rx:28Hcv1368 Ordered   6  Metoprolol Tartrate 25 MG Oral Tablet; take 1/2  tablet twice a day; Therapy: 74BFJ8764 to (Evaluate:2016)  Requested for: 40Ajx2976; Last   Rx:40Pyj5269 Ordered    7  TraZODone HCl - 50 MG Oral Tablet; Take 1 tablet by mouth at bedtime; Therapy: 63QAI8968 to (Inman Mires)  Requested for: 95EIF6481; Last   Rx:2012 Ordered    8  Fenofibrate 145 MG Oral Tablet (Tricor); TAKE 1 TABLET BY MOUTH ONCE DAILY; Therapy: 06JUX8727 to (Evaluate:2017)  Requested for: 10BQT7545; Last   Rx:48Bsk8171 Ordered    9  Digox 125 MCG Oral Tablet; TAKE 1 TABLET BY MOUTH     EVERY OTHER DAY; Therapy: 2014 to (Virlinda Abo)  Requested for: 42OEC5647; Last   Rx:56Ljr9330 Ordered   10  Digoxin 125 MCG Oral Tablet; take one tablet by mouth every other day; Therapy: 98CXM0420 to (Evaluate:2017)  Requested for: 83YVV5741; Last    Rx:40Azq4207 Ordered   11  Warfarin Sodium 3 MG Oral Tablet; take 1 tablet by mouth once daily; Therapy: 93EZW0270 to (Virlinda Abo)  Requested for: 56LNP8390; Last    Rx:23Xpl6222 Ordered    12  LevETIRAcetam 500 MG Oral Tablet; take one tablet by mouth twice daily; Therapy: 96XHU4782 to (Evaluate:65Zhx0361)  Requested for: 39ZOO2061; Last    H37VAQ0844 Ordered    13   MetFORMIN HCl - 500 MG Oral Tablet; TAKE 1 TABLET TWICE DAILY  Requested for:    23VUR2446; Last Rx:39Woh3061 Ordered    14  Acetaminophen 500 MG Oral Tablet; TAKE 1 TABLET EVERY 4 TO 6 HOURS AS    NEEDED; Therapy: (Recorded:35Jsb8508) to Recorded   15  Calcium 600 + D TABS; TAKE 1 TABLET DAILY; Therapy: (Recorded:05Wvy9116) to Recorded   16  GuaiFENesin TABS; TAKE 1 TABLET EVERY 4 HOURS AS NEEDED; Therapy: (Recorded:33Zik4111) to Recorded   17  Spiriva Respimat AERS; INHALE 2 PUFFS BY MOUTH DAILY; Therapy: (Recorded:65Xde0136) to Recorded    Future Appointments    Date/Time Provider Specialty Site   03/16/2017 10:40 AM MARYCARMEN Fernandez  Endocrinology [facility]   10/31/2016 10:45 AM MARYCARMEN Talavera  Pulmonary Medicine [facility] [address] [facility] [address]   12/06/2016 01:00 PM MARYCARMEN López  Family [address]   12/05/2016 10:00 AM Elliott Abreu MD Thoracic Surgery CT SURGERY [facility] OFFICE     Message   Recorded as Task   Date: 10/04/2016 02:41 PM, Created By: Linda Santos   Task Name: Call Back   Assigned To: Linda Santos   Regarding Patient: Betty Gibson, Status: In Progress   CommentLenor Abbie - 04 Oct 2016 2:41 PM     TASK CREATED  Patient discharged from [facility] on 9/13/16  Called patient at this time to assess progress and address any questions or concerns  There was no answer  Left message on machine with my contact information to return call  Erendira Garrett - 04 Oct 2016 2:41 PM     TASK IN PROGRESS   Linda Santos - 10 Oct 2016 1:34 PM     TASK EDITED  Called patient for the second attempt at this time  There was no answer  Left message on machine with my contact information to return call  Linda Santos - 10 Oct 2016 2:56 PM     TASK EDITED  Patient's daughter, Salvador Horn, returned call at this time  See CC note       Patient Care Team    Care Team Member Role Specialty Office Number   Melissa Mariee MD Specialist Cardiology [phone]   Elliott Abreu MD  Thoracic Surgery [phone]   Jocelynn BRODERICK  Family Medicine [phone]   Uli BRODERICK    Endocrinology [phone]     Signatures   Electronically signed by : Qi Hays; Oct 10 2016  3:05PM EST                       (Author)

## 2018-01-13 VITALS
RESPIRATION RATE: 16 BRPM | SYSTOLIC BLOOD PRESSURE: 120 MMHG | BODY MASS INDEX: 27.14 KG/M2 | HEART RATE: 72 BPM | HEIGHT: 64 IN | WEIGHT: 159 LBS | TEMPERATURE: 97.5 F | DIASTOLIC BLOOD PRESSURE: 64 MMHG

## 2018-01-13 VITALS
WEIGHT: 155.5 LBS | HEIGHT: 64 IN | BODY MASS INDEX: 26.55 KG/M2 | SYSTOLIC BLOOD PRESSURE: 148 MMHG | DIASTOLIC BLOOD PRESSURE: 80 MMHG | HEART RATE: 60 BPM

## 2018-01-13 VITALS
BODY MASS INDEX: 25.95 KG/M2 | SYSTOLIC BLOOD PRESSURE: 160 MMHG | WEIGHT: 152 LBS | DIASTOLIC BLOOD PRESSURE: 96 MMHG | HEIGHT: 64 IN

## 2018-01-13 VITALS
HEIGHT: 64 IN | SYSTOLIC BLOOD PRESSURE: 140 MMHG | BODY MASS INDEX: 26.29 KG/M2 | WEIGHT: 154 LBS | OXYGEN SATURATION: 93 % | DIASTOLIC BLOOD PRESSURE: 80 MMHG | HEART RATE: 64 BPM

## 2018-01-13 VITALS
RESPIRATION RATE: 16 BRPM | HEIGHT: 64 IN | HEART RATE: 68 BPM | WEIGHT: 150 LBS | SYSTOLIC BLOOD PRESSURE: 108 MMHG | TEMPERATURE: 97.3 F | DIASTOLIC BLOOD PRESSURE: 70 MMHG | BODY MASS INDEX: 25.61 KG/M2 | OXYGEN SATURATION: 92 %

## 2018-01-13 NOTE — MISCELLANEOUS
Assessment    1  Acute exacerbation of chronic obstructive pulmonary disease (COPD) (491 21) (J44 1)   2  Acute tracheobronchitis (466 0) (J20 9)   3  Chronic diastolic congestive heart failure (428 32,428 0) (I50 32)   4  Chronic obstructive pulmonary disease (496) (J44 9)   5  Type 2 diabetes mellitus (250 00) (E11 9)   6  Paroxysmal atrial fibrillation (427 31) (I48 0)   7  Pulmonary hypertension (416 8) (I27 2)   8  Obstructive sleep apnea (327 23) (G47 33)   9  Hypertension (401 9) (I10)   10  Mixed hyperlipidemia (272 2) (E78 2)   11  Mitral regurgitation (424 0) (I34 0)   12  Seizure disorder (345 90) (G40 909)   13  Squamous cell carcinoma of right lung (162 9) (C34 91)    Plan  Need for prophylactic vaccination and inoculation against influenza    · Fluzone High-Dose 0 5 ML Intramuscular Suspension Prefilled Syringe   For: Need for prophylactic vaccination and inoculation against influenza; Ordered By:Nathalia Valenitn; Effective Date:22Sep2016; Administered by: Coahoma Pals: 9/22/2016 2:00:00 PM; Last Updated By: Marv Mott; 9/22/2016 2:01:14 PM    Discussion/Summary  Discussion Summary:   Continue with current medications  Flu vaccine today  Repeat pro time next month  office visit in 6 months  Follow-up office visit with pulmonary medicine  History of Present Illness  TCM Communication [facility]: The patient is being contacted for follow-up after hospitalization  Hospital course was discussed with the inpatient physician  She was hospitalized at [place] [address]  The date of admission: 09/08/16, date of discharge: 09/13/16  Diagnosis: TRACHEOBRONCHITIS, CHF, AFIB, HTN, DM, CONSTIPATION, COPD  She was discharged to home  Medications reviewed and updated today  She scheduled a follow up appointment  Counseling was provided to patient's family  DAUGHTER  Topics counseled included importance of compliance with treatment     Communication performed and completed by Jesus Oviedo   HPI: follow up visit  ARNOLD acute exacerbation COPD and tracheobronchitis  CXR no infiltrates  + ILD  admission labs CBC normal except for WBC 10 220  Hgb 14  3  platelets 800,592  BMP normal except for bicarb 34  K+ 4 2  creatinine 1 12  she was discharged on Spiriva and oxygen  she has completed antibiotics and oral steroids  medications reviewed  prior admission 11/2015 for acute on chronic diastolic CHF  CXR bilateral pleural effusions  interstitial  echocardiogram normal LV systolic function  EF 65 %  moderate TR  severe pulmonary hypertension  out patient labs 06/2016 see note  type 2 DM A1c 6 2  urine microalbumin 1 0  on ARB  no hypoglycemic events  no neuropathy symptoms  she is followed by podiatry  current with eye exam  hyperlipidemia on Pravastatin 80 mg daily  lipid profile 06/2016 cholesterol 126  TGs 92  HDL 51  LDL 57  LFTs normal  hypertension blood pressures stable on current regimen  creatinine 0 99  electrolytes normal       Review of Systems  Complete-Female:   Constitutional: recent 4 lb weight gain and feeling tired, but no fever and no chills  Eyes: no eyesight problems  ENT: history of left thyroid nodule biopsy x 2 negative  she is followed by endocrinology  , but no sore throat and no hoarseness  Cardiovascular: PAF on Warfarin, low dose beta blocker and digoxin  , but no chest pain, no palpitations and no lower extremity edema  Respiratory: squamous cell CA s/p RU lobectomy in 2009  she is followed by thoracic surgery  CT scan chest 11/2015 no evidence of recurrence  stable emphysematous changes  RUBY on CPAP  , but no cough, no orthopnea, no wheezing, no shortness of breath during exertion and no PND  Gastrointestinal: no abdominal pain, no nausea, no vomiting, no constipation and no diarrhea  Genitourinary: no dysuria and no incontinence  Musculoskeletal: arthralgias and OA left knee  she has been using prn Tylenol for pain  s/p right THR  , but no myalgias     Neurological: no recent falls  history of seizure disorder on Keppra , but no headache, no dizziness and no difficulty walking  Psychiatric: sleep disturbances and chronic insomnia on Trazodone  Active Problems    1  Acute exacerbation of chronic obstructive pulmonary disease (COPD) (491 21) (J44 1)   2  Chronic diastolic congestive heart failure (428 32,428 0) (I50 32)   3  Chronic obstructive pulmonary disease (496) (J44 9)   4  CT Lung Pulmonary Nodule (< 6cm )   5  Depression (311) (F32 9)   6  Discoloration of skin of lower leg (709 00) (L81 9)   7  Dyslipidemia (272 4) (E78 5)   8  Edema (782 3) (R60 9)   9  Fatty liver (571 8) (K76 0)   10  Hypertension (401 9) (I10)   11  Insomnia (780 52) (G47 00)   12  Mitral regurgitation (424 0) (I34 0)   13  Mixed hyperlipidemia (272 2) (E78 2)   14  Non-toxic multinodular goiter (241 1) (E04 2)   15  Nontoxic single thyroid nodule (241 0) (E04 1)   16  Obstructive sleep apnea (327 23) (G47 33)   17  Osteoporosis (733 00) (M81 0)   18  Paroxysmal atrial fibrillation (427 31) (I48 0)   19  Pulmonary hypertension (416 8) (I27 2)   20  Pulmonary nodules (793 19) (R91 8)   21  Right hip pain (719 45) (M25 551)   22  Seizure disorder (345 90) (G40 909)   23  Squamous cell carcinoma of right lung (162 9) (C34 91)   24  Type 2 diabetes mellitus (250 00) (E11 9)    Past Medical History    1  History of Acute on chronic diastolic congestive heart failure (428 33,428 0) (I50 33)   2  History of Cataract, right eye (366 9) (H26 9)   3  History of Epistaxis (784 7) (R04 0)   4  History of actinic keratosis (V13 3) (Z87 2)   5  History of anemia (V12 3) (Z86 2)   6  History of stroke without residual deficits (V12 54) (Z86 73)   7  History of Lung Cancer (V10 11)   8  History of Pneumonia (486) (J18 9)   9  History of Pulmonary Embolism (V12 51)   10  History of Trochanteric bursitis of left hip (726 5) (R90 76)    Surgical History    1  History of Cholecystectomy   2   History of Craniotomy (Therapeutic)   3  History of Thyroid Surgery Sub-Total Thyroidectomy   4  History of Tonsillectomy   5  History of Treatment Of Hip Fracture    Family History  Mother    1  Family history of Diabetes Mellitus (V18 0)   2  Family history of Reported Family History Of Heart Disease  Father    3  Family history of Cancer  Family History    4  Family history of cerebrovascular accident (V17 1) (Z82 3)   5  Family history of hypertension (V17 49) (Z82 49)   6  Family history of sudden cardiac death (SCD) (V17 41) (Z82 41)    Social History    · Denied: Alcohol use   · Denied: Drug use (305 90) (F19 90)   · Former smoker (V15 82) (J44 009)   · Single    Current Meds   1  Digox 125 MCG Oral Tablet; TAKE 1 TABLET BY MOUTH     EVERY OTHER DAY; Therapy: 94Szk0641 to (Clara York)  Requested for: 73MQX1257; Last   Rx:52Yxl2482 Ordered   2  Fenofibrate 145 MG Oral Tablet; TAKE 1 TABLET BY MOUTH ONCE DAILY; Therapy: 84FZV2535 to (Evaluate:06Jan2017)  Requested for: 82HIK3177; Last   Rx:65Xzz4166 Ordered   3  Furosemide 40 MG Oral Tablet; TAKE ONE TABLET BY MOUTH ONCE DAILY; Therapy: 64AWD8496 to (Evaluate:57Whn7253)  Requested for: 45Wtq8524; Last   Rx:01Kaf8534 Ordered   4  LevETIRAcetam 500 MG Oral Tablet; take one tablet by mouth twice daily; Therapy: 16XLB7931 to (Evaluate:03Dan6386)  Requested for: 95SCL7874; Last   BS:74VDC1197 Ordered   5  Losartan Potassium 50 MG Oral Tablet; TAKE ONE TABLET BY MOUTH ONCE DAILY; Therapy: 78SNG1461 to (Dariusz Sandra)  Requested for: 26Pcq5365; Last   Rx:55Omh5147 Ordered   6  MetFORMIN HCl - 500 MG Oral Tablet; TAKE 1 TABLET TWICE DAILY  Requested for:   00VLS8984; Last Rx:66Lve1757 Ordered   7  Metoprolol Tartrate 25 MG Oral Tablet; take 1/2  tablet twice a day; Therapy: 23ZPV5798 to (Evaluate:43Qre7535)  Requested for: 01Qko9205; Last   Rx:87Xku5016 Ordered   8  Os-Vaibhav TABS; Take 1 tablet twice daily; Therapy: (Recorded:19Jun2013) to Recorded   9   Pravastatin Sodium 80 MG Oral Tablet; TAKE 1 TABLET DAILY; Therapy: 41XKB5316 to (Evaluate:53Ybi4476)  Requested for: 04PDQ5797; Last   TT:91CCK3145 Ordered   10  Spiriva Respimat AERS; INHALE 2 PUFFS BY MOUTH DAILY; Therapy: (Unique Luis) to Recorded   11  TraZODone HCl - 50 MG Oral Tablet; Take 1 tablet by mouth at bedtime; Therapy: 70OCL0517 to (Elisabet Johnson)  Requested for: 88DIT9240; Last    Rx:35Efh7616 Ordered   12  Warfarin Sodium 3 MG Oral Tablet; take 1 tablet by mouth once daily; Therapy: 26QLB1486 to (Denita Goltz)  Requested for: 42SJA9660; Last    Rx:00Rcx8555 Ordered    Allergies    1  Penicillins    Vitals  Signs   Recorded: 34FEC5339 02:07PG   Systolic: 94  Diastolic: 52  Heart Rate: 88  Respiration: 18  Temperature: 97 3 F  Height: 5 ft 3 5 in  Weight: 158 lb 6 08 oz  BMI Calculated: 27 62  BSA Calculated: 1 76    Physical Exam    Constitutional   General appearance: No acute distress, well appearing and well nourished  Ears, Nose, Mouth, and Throat   Otoscopic examination: Tympanic membranes translucent with normal light reflex  Canals patent without erythema  Oropharynx: Normal with no erythema, edema, exudate or lesions  Neck   Neck: Supple, symmetric, trachea midline, no masses  Thyroid: Abnormal   left thyroid nodule  Pulmonary   Auscultation of lungs: Clear to auscultation  Cardiovascular   Auscultation of heart: Normal rate and rhythm, normal S1 and S2, no murmurs  Carotid pulses: 2+ bilaterally  no bruit heard over the right carotid and no bruit heard over the left carotid  Pedal pulses: 2+ bilaterally  Examination of extremities for edema and/or varicosities: Normal     Abdomen   Abdomen: Non-tender, no masses  Liver and spleen: No hepatomegaly or splenomegaly  Lymphatic   Palpation of lymph nodes in neck: No lymphadenopathy    no anterior cervical node enlargement, no posterior cervical node enlargement, no submandibular node enlargement and no supraclavicular node enlargement  Musculoskeletal   Gait and station: Normal     Joints, bones, and muscles: Abnormal   VALGUS DEFORMITY LEFT KNEE  + CREPITUS ON ROM  NO JOINT LINE TENDERNESS  NO EFFUSION  Range of motion: Normal   Range of Motion: Right Hip: Full  Left Hip: Full  Skin   Skin and subcutaneous tissue: Normal without rashes or lesions  Psychiatric   Mood and affect: Normal        Results/Data  Coumadin Flow Sheet 62Rdb7323 12:00AM      Test Name Result Flag Reference   Recheck INR 16Mxq8632     Current Dose 3mg     New Dose same     Patient Notified yes       (1) CULTURE, BLOOD BACT [id number] 07:21PM [facility], Provider   Test ordered by: Ml Kang     Test Name Result Flag Reference   CLINICAL REPORT (Report)     Test:        Blood culture  Specimen Source:  Hand, Right  Specimen Type:   Blood  Specimen Date:   9/8/2016 7:21 PM  Result Date:    9/13/2016 11:01 PM  Result Status:   Final result  Resulting Lab:   [facility] [address] [place] [id number]            Tel: [phone]      CULTURE                                       ------------------                                   No Growth After 5 Days  (1) CULTURE, BLOOD BACT [id number] 07:20PM [facility], Provider   Test ordered by: Ml aKng     Test Name Result Flag Reference   CLINICAL REPORT (Report)     Test:        Blood culture  Specimen Source:  Arm, Right  Specimen Type:   Blood  Specimen Date:   9/8/2016 7:20 PM  Result Date:    9/13/2016 11:01 PM  Result Status:   Final result  Resulting Lab:   [address] [address]            Tel: [phone]      CULTURE                                       ------------------                                   No Growth After 5 Days       (1) CBC/PLT/DIFF 03XRK7785 08:17AM Ollen Mink     Test Name Result Flag Reference   WHITE BLOOD CELL COUNT 4 7 Thousand/uL  3 8-10 8   RED BLOOD CELL COUNT 4 56 Million/uL  3 80-5 10   HEMOGLOBIN 14 3 g/dL  11 7-15 5   HEMATOCRIT 44 9 %  35 0-45 0   LYMPHOCYTES 33 4 %     MONOCYTES 7 4 %     EOSINOPHILS 1 8 %     BASOPHILS 0 4 %     ABSOLUTE NEUTROPHILS 2679 cells/uL  8291-3420   ABSOLUTE LYMPHOCYTES 1570 cells/uL  850-3900   ABSOLUTE MONOCYTES 348 cells/uL  200-950   ABSOLUTE EOSINOPHILS 85 cells/uL     ABSOLUTE BASOPHILS 19 cells/uL  0-200   NEUTROPHILS 57 0 %     MCV 98 4 fL  80 0-100 0   MCH 31 4 pg  27 0-33 0   MCHC 31 9 g/dL L 32 0-36 0   RDW 15 7 % H 11 0-15 0   PLATELET COUNT 211 Thousand/uL  140-400   MPV 9 3 fL  7 5-11 5     (1) COMPREHENSIVE METABOLIC PANEL 98KRP5297 09:49OH Lajoyce Rice     Test Name Result Flag Reference   GLUCOSE 92 mg/dL  65-99   Fasting reference interval   UREA NITROGEN (BUN) 20 mg/dL  7-25   CREATININE 0 99 mg/dL H 0 60-0 88   For patients >52years of age, the reference limit  for Creatinine is approximately 13% higher for people  identified as -American  eGFR NON-AFR  AMERICAN 52 mL/min/1 73m2 L > OR = 60   eGFR AFRICAN AMERICAN 60 mL/min/1 73m2  > OR = 60   BUN/CREATININE RATIO 20 (calc)  6-22   ALT 14 U/L  6-29   ALBUMIN 4 0 g/dL  3 6-5 1   GLOBULIN 2 4 g/dL (calc)  1 9-3 7   ALBUMIN/GLOBULIN RATIO 1 7 (calc)  1 0-2 5   BILIRUBIN, TOTAL 0 6 mg/dL  0 2-1 2   ALKALINE PHOSPHATASE 24 U/L L    Verified by repeat analysis  AST 21 U/L  10-35   SODIUM 139 mmol/L  135-146   POTASSIUM 4 3 mmol/L  3 5-5 3   CHLORIDE 101 mmol/L     CARBON DIOXIDE 27 mmol/L  19-30   CALCIUM 9 0 mg/dL  8 6-10 4   PROTEIN, TOTAL 6 4 g/dL  6 1-8 1     (1) LIPID PANEL, FASTING [id number] 08:17AM Lajoyce Rice     Test Name Result Flag Reference   CHOLESTEROL, TOTAL 126 mg/dL  125-200   HDL CHOLESTEROL 51 mg/dL  > OR = 46   TRIGLICERIDES 92 mg/dL  <803   LDL-CHOLESTEROL 57 mg/dL (calc)  <130   Desirable range <100 mg/dL for patients with CHD or  diabetes and <70 mg/dL for diabetic patients with  known heart disease  CHOL/HDLC RATIO 2 5 (calc)  < OR = 5 0   NON HDL CHOLESTEROL 75 mg/dL (calc)     Target for non-HDL cholesterol is 30 mg/dL higher than   LDL cholesterol target  (Q) MICROALBUMIN, RANDOM URINE (W/CREATININE) [id number] 08:17AM Deyanira Osman     Test Name Result Flag Reference   CREATININE, RANDOM URINE 38 mg/dL     MICROALBUMIN 1 0 mg/dL     Reference Range  Not established   MICROALBUMIN/CREATININE$RATIO, RANDOM URINE 26 mcg/mg creat  <30   The ADA defines abnormalities in albumin  excretion as follows:     Category         Result (mcg/mg creatinine)     Normal                    <30  Microalbuminuria            Clinical albuminuria   > OR = 300     The ADA recommends that at least two of three  specimens collected within a 3-6 month period be  abnormal before considering a patient to be  within a diagnostic category  (Q) HEMOGLOBIN A1c [id number] 08:17AM Deyanira Brewer   REPORT COMMENT:  FASTING:YES     Test Name Result Flag Reference   HEMOGLOBIN A1c 6 2 % of total Hgb H <5 7   According to ADA guidelines, hemoglobin A1c <7 0%  represents optimal control in non-pregnant diabetic  patients  Different metrics may apply to specific  patient populations  Standards of Medical Care in    Diabetes Care  2013;36:s11-s66     For the purpose of screening for the presence of  diabetes  <5 7%       Consistent with the absence of diabetes  5 7-6 4%    Consistent with increased risk for diabetes              (prediabetes)  >or=6 5%    Consistent with diabetes     This assay result is consistent with a higher risk  of diabetes  Currently, no consensus exists for use of hemoglobin  A1c for diagnosis of diabetes for children  Health Management  Chronic obstructive pulmonary disease   Complete PFT; every 1 year; Next Due: 23ZVA5886; Overdue  Type 2 diabetes mellitus   *VB - Eye Exam; every 1 year; Last 96LDC7809; Next Due: 99EST7079;  Overdue  Health Maintenance   BREAST EXAM; every 1 year; Next Due: 56Opd8782; Overdue  Medicare Annual Wellness Visit; every 1 year; Next Due: 64YZF9628; Overdue    Message   Recorded as Task  Date: 09/13/2016 01:48 PM, Created By: System  Task Name: Teodora Garza  Assigned To: Johnie Holland  Regarding Patient: Yury De Leon, Status: Active  Comment:   System - 13 Sep 2016 1:48 PM    Patient discharged from hospital   Patient Name: Severo Melgar  Patient YOB: 1930  Discharge Date: 9/13/2016  Facility: [facility] 14 Sep 2016 8:51 AM    TASK REASSIGNED: Previously Assigned To Edison Gan     Future Appointments    Date/Time Provider Specialty Site   03/16/2017 10:40 MARYCARMEN Wiggins  Endocrinology [facility]OCR[facility]OLOG[facility]   12/06/2016 01:00 PM MARYCARMEN Andres  Family Medicine [address] [facility] [place]   12/05/2016 10:00 AM Candie Natarajan MD Thoracic Surgery CT SURGERY [facility] OFF[facility]   09/28/2016 10:40 AM MARYCARMEN Goss   Cardiology [facility] CARDIOLOGY [place]     Signatures   Electronically signed by : MARYCARMEN Issa ; Sep 22 2016  6:02PM EST                       (Author)

## 2018-01-13 NOTE — MISCELLANEOUS
Assessment    1  Acute on chronic diastolic congestive heart failure (428 33,428 0) (I50 33)   2  Acute URI (465 9) (J06 9)   3  Chronic obstructive pulmonary disease (496) (J44 9)   4  Pulmonary hypertension (416 8) (I27 2)   5  Paroxysmal atrial fibrillation (427 31) (I48 0)   6  Type 2 diabetes mellitus (250 00) (E11 9)   7  Squamous cell carcinoma of right lung (162 9) (C34 91)   8  Obstructive sleep apnea (327 23) (G47 33)   9  Pulmonary nodules (793 19) (R91 8)   10  Nontoxic single thyroid nodule (241 0) (E04 1)   11  Seizure disorder (345 90) (G40 909)    Plan  Type 2 diabetes mellitus    · Eat a normal well-balanced diet ; Status:Complete;   Done: 03JAW3634 03:39PM   Ordered; For:Type 2 diabetes mellitus; Ordered By:Melida Valentin;   · Have your eyes examined by an eye doctor every year ; Status:Complete;   Done:  26EFV8420 03:39PM   Ordered; For:Type 2 diabetes mellitus; Ordered By:Melida Valentin;   · It is important to take good care of your feet if you have diabetes ; Status:Complete;    Done: 54GJS5740 03:39PM   Ordered; For:Type 2 diabetes mellitus; Ordered By:Melida Valentin;   · Seek Immediate Medical Attention if: There are signs that the blood sugar is too low  (hypoglycemia) ; Status:Complete;   Done: 40UJR9011 03:39PM   Ordered; For:Type 2 diabetes mellitus; Ordered By:Melida Valentin; Discussion/Summary  Discussion Summary:   Finish antibiotics  Symptom treatment for cough  Continue with as needed Albuterol and oxygen therapy  She is scheduled to see pulmonary medicine next week  Continue with same dose of Warfarin pro time in one week  Flu vaccine in the fall  She is up-to-date with pneumococcal vaccines  Office visit in 3 to 4 months  Medication SE Review and Pt Understands Tx: Possible side effects of new medications were reviewed with the patient/guardian today  The treatment plan was reviewed with the patient/guardian   The patient/guardian understands and agrees with the treatment plan History of Present Illness  TCM Communication [facility]: The patient is being contacted for follow-up after hospitalization  Hospital course was discussed with the inpatient physician  She was hospitalized [address]  The date of admission: 08/12/2017, date of discharge: 08/14/2017  Diagnosis: CHF, ACUTE ON CHRONIC/CHRONIC AFIB/HTN/DIABETES MELLITUS/COPD/HYPOXIA/CPAP/PULMONARY NODULE/HYPOTHYROID/HLD/SUPRATHERAPEUTIC INR  She was discharged to home  Medications were not reviewed today  She scheduled a follow up appointment  Counseling was provided to patient's family  DAUGHTER FLORENCIO  Topics counseled included importance of compliance with treatment  Communication performed and completed by Neo Good   HPI: follow up visit  ARNOLD s/p admission for acute on chronic diastolic CHF  patient developed a productive cough with increased SOB  pulse oximetry at home 79%  known history of COPD with use of oxygen at nighttime  Squamous cell carcinoma low status post right upper lobectomy in 2009  work up in the hospital  CXR  BNP elevated at 2,148  CT chest no PE  moderate centrilobular emphysema  7 mm right lower lobe nodule increased in size  Scattered subcentimeter mediastinal and hilar lymph nodes  Mild cardiomegaly  No pericardial mass or effusion  Atherosclerotic changes in the coronary arteries and aorta  Echocardiogram normal left ventricular systolic function  EF 55 to 65% range  No regional wall motion abnormalities  Mild concentric hypertrophy  Grade 2 diastolic dysfunction  Mildly dilated right and left atrium  Moderate mitral regurgitation  Moderate tricuspid regurgitation  Severe pulmonary hypertension  No pericardial effusion  Labs random blood glucose 99  Electrolytes normal except for bicarbonate 33  Magnesium 1 9  Creatinine 0 5  LFTs normal  Calcium 9 5  Troponin less than 0 02  INR 3 2  CBC WBC 7,890  Hemoglobin 15 1  Platelet count 442,975  Lactic acid normal at 0 9   His TSH 3 050 Patient was discharged on Zithromax  Warfarin had been on hold INR yesterday 1 3 she is currently on warfarin 5 mg daily  she is on continuous oxygen therapy  Current medications reviewed  she has not been using Spiriva  She has as needed Albuterol inhaler  type 2 DM on Metformin 500 mg BID  04/2017 A1c 6 1  06/2016 urine microalbumin 1 0 on ARB  no hypoglycemia  no DPN  current with eye exam  hypertension BPs stable on current regimen  04/2017 creatinine 0 97  electrolytes normal except bicarb 32  hyperlipidemia on Pravastatin 80 mg daily  lipid profile 06/2017 cholesterol 155  TGs 84  HDL 53  LDL 85  LFTs normal  CPK 69  s/p admission 09/2016 for acute exacerbation COPD and tracheobronchitis  CXR no infiltrates  + ILD  admission labs CBC normal except for WBC 10 220  Hgb 14  3  platelets 148,624  BMP normal except for bicarb 34  K+ 4 2  creatinine 1 12  admission 11/2015 for acute on chronic diastolic CHF  CXR bilateral pleural effusions  interstitial  echocardiogram normal LV systolic function  EF 65 %  moderate TR  severe pulmonary hypertension  Review of Systems  Complete-Female:   Constitutional: feeling tired and recent 7 lb weight loss, but no fever and no chills  Eyes: no eyesight problems  ENT: history of left thyroid nodule biopsy x 2 negative  she is followed by endocrinology  , but no sore throat and no hoarseness  Cardiovascular: PAF on Warfarin, low dose beta blocker and digoxin  , but no chest pain, no palpitations and no lower extremity edema  Respiratory: squamous cell CA s/p RU lobectomy in 2009  she is followed by thoracic surgery  CT scan chest 11/2016 no evidence of recurrence  Moderate to severe central lobular emphysema  2 pulmonary nodules seen on right pleural-based and unchanged from previous studies  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 at the level of the right main pulmonary artery    RUBY stable on CPAP  she is compliant with her usage  , but no cough, no orthopnea, no wheezing, no shortness of breath during exertion and no PND  Gastrointestinal: no abdominal pain, no nausea, no vomiting, no constipation and no diarrhea  Genitourinary: no dysuria and no incontinence  Musculoskeletal: arthralgias and OA left knee  she has been using prn Tylenol for pain  s/p right THR  , but no myalgias  Neurological: no recent falls  history of seizure disorder on Keppra  08/2017 Keppra level 16 normal 10 to 40, but no headache, no dizziness and no difficulty walking  Psychiatric: sleep disturbances and chronic insomnia on Trazodone  Endocrine: 02/2017 TSH mildly elevated at 5 56  not on medication  02/2016 TSH 2 90  Active Problems    1  Advanced directives, counseling/discussion (V65 49) (Z71 89)   2  Anticoagulant long-term use (V58 61) (Z79 01)   3  Chronic diastolic congestive heart failure (428 32,428 0) (I50 32)   4  Chronic obstructive pulmonary disease (496) (J44 9)   5  CT Lung Pulmonary Nodule (< 6cm )   6  Depression (311) (F32 9)   7  Elevated TSH (794 5) (R94 6)   8  Fatty liver (571 8) (K76 0)   9  Hypertension (401 9) (I10)   10  Insomnia (780 52) (G47 00)   11  Lung nodule (793 11) (R91 1)   12  Mitral regurgitation (424 0) (I34 0)   13  Mixed hyperlipidemia (272 2) (E78 2)   14  Non-toxic multinodular goiter (241 1) (E04 2)   15  Nontoxic single thyroid nodule (241 0) (E04 1)   16  Obstructive sleep apnea (327 23) (G47 33)   17  Osteoporosis (733 00) (M81 0)   18  Paroxysmal atrial fibrillation (427 31) (I48 0)   19  Pulmonary hypertension (416 8) (I27 2)   20  Pulmonary nodules (793 19) (R91 8)   21  Screening for genitourinary condition (V81 6) (Z13 89)   22  Seizure disorder (345 90) (G40 909)   23  Squamous cell carcinoma of right lung (162 9) (C34 91)   24  Type 2 diabetes mellitus (250 00) (E11 9)    Past Medical History    1  History of Acute exacerbation of chronic obstructive pulmonary disease (COPD) (491 21)   (J44 1)   2   History of Acute on chronic diastolic congestive heart failure (428 33,428 0) (I50 33)   3  History of Acute tracheobronchitis (466 0) (J20 9)   4  History of Cataract, right eye (366 9) (H26 9)   5  History of Epistaxis (784 7) (R04 0)   6  History of actinic keratosis (V13 3) (Z87 2)   7  History of anemia (V12 3) (Z86 2)   8  History of stroke without residual deficits (V12 54) (Z86 73)   9  History of Pneumonia (486) (J18 9)   10  History of Pulmonary Embolism (V12 51)   11  History of Trochanteric bursitis of left hip (726 5) (M70 62)    Surgical History    1  History of Cholecystectomy   2  History of Craniotomy (Therapeutic)   3  History of Thyroid Surgery Sub-Total Thyroidectomy   4  History of Tonsillectomy   5  History of Treatment Of Hip Fracture    Family History  Mother    1  Family history of Diabetes Mellitus (V18 0)   2  Family history of Reported Family History Of Heart Disease  Father    3  Family history of Cancer  Family History    4  Family history of cerebrovascular accident (V17 1) (Z82 3)   5  Family history of hypertension (V17 49) (Z82 49)   6  Family history of sudden cardiac death (SCD) (V17 41) (Z82 41)    Social History    · Denied: Alcohol use   · Denied: Drug use (305 90) (F19 90)   · Former smoker (V15 82) (I46 524)   · Single    Current Meds   1  Acetaminophen 500 MG Oral Tablet; TAKE 1 TABLET EVERY 4 TO 6 HOURS AS   NEEDED; Therapy: (Recorded:00Gzg2325) to Recorded   2  Calcium 600 + D TABS; TAKE 1 TABLET DAILY; Therapy: (Recorded:77Eop3692) to Recorded   3  Digoxin 125 MCG Oral Tablet; take one tablet by mouth every other day; Therapy: 63NJB4047 to (Evaluate:01Qfq0672)  Requested for: 78OOX9052; Last   Rx:26Mar2017 Ordered   4  Furosemide 40 MG Oral Tablet; TAKE ONE TABLET BY MOUTH ONCE DAILY; Therapy: 34HZC6681 to (Evaluate:18Tmf9065)  Requested for: 94LCH5371; Last   Rx:66Gph9376 Ordered   5  LevETIRAcetam 500 MG Oral Tablet; take one tablet by mouth twice daily;    Therapy: 14BXA7325 to (Evaluate:17Fcl7820)  Requested for: 66Iav4868; Last   Rx:79Rzp4919 Ordered   6  Losartan Potassium 50 MG Oral Tablet; TAKE ONE TABLET BY MOUTH ONCE DAILY; Therapy: 11EEP7067 to (Tod Harvey)  Requested for: 38GSG3803; Last   Rx:53Hrk2883 Ordered   7  MetFORMIN HCl - 500 MG Oral Tablet; take one tablet by mouth twice daily; Therapy: 43XIQ9071 to (Evaluate:18Oct2017)  Requested for: 23Oct2016; Last   Rx:23Oct2016 Ordered   8  Metoprolol Tartrate 25 MG Oral Tablet; TAKE ONE-HALF TABLET BY MOUTH TWICE   DAILY; Therapy: 93Ump2776 to (Evaluate:11Jan2018)  Requested for: 73EGL6148; Last   Rx:16Jan2017 Ordered   9  Os-Vaibhav TABS; Take 1 tablet twice daily; Therapy: (Recorded:19Jun2013) to Recorded   10  Pravastatin Sodium 80 MG Oral Tablet; TAKE ONE TABLET BY MOUTH ONCE DAILY; Therapy: 96ZEH2286 to (Evaluate:12Nov2017)  Requested for: 57Qdj5413; Last    Rx:93Gzg5841; Status: ACTIVE - Retrospective Authorization Ordered   11  Spiriva Respimat AERS; INHALE 2 PUFFS BY MOUTH DAILY; Therapy: (Mary Bey) to Recorded   12  TraZODone HCl - 50 MG Oral Tablet; Take 1 tablet by mouth at bedtime; Therapy: 40LLZ6330 to (Hafsa Moy)  Requested for: 45JRE8502; Last    Rx:10Oct2012 Ordered   13  Ventolin  (90 Base) MCG/ACT Inhalation Aerosol Solution; 2 puffs 4 times a day    as needed; Therapy: 97HAM3428 to (Evaluate:21Jan2017)  Requested for: 66TXA9372; Last    Rx:96Deb2591 Ordered   15  Warfarin Sodium 1 MG Oral Tablet; TAKE 1MG ONCE DAILY WITH 4MG TABLET FOR A    TOTAL OF 5MG DAILY; Therapy: 79FYI3276 to (Evaluate:21Jun2017)  Requested for: 79TMN6865; Last    Rx:87Cwb4643 Ordered   15  Warfarin Sodium 3 MG Oral Tablet; TAKE ONE TABLET BY MOUTH ONCE DAILY; Therapy: 95YZE0929 to (Hilda Perez)  Requested for: 28Nov2016; Last    Rx:28Nov2016 Ordered   16  Warfarin Sodium 4 MG Oral Tablet; TAKE 1 TABLET DAILY AS DIRECTED;     Therapy: 57UWR3994 to (Evaluate:69Arh9824)  Requested for: 68AXA0349; Last    Rx:21Gwz0032 Ordered   17  Warfarin Sodium 5 MG Oral Tablet; TAKE 1 TABLET DAILY AS DIRECTED  Requested for:    30YGK7465; Last Rx:02Ypx5707 Ordered    Allergies    1  Penicillins    Vitals  Signs   Recorded: 17Aug2017 01:33PM   Temperature: 97 2 F  Heart Rate: 78  Respiration: 18  Systolic: 990  Diastolic: 78  Height: 5 ft 3 5 in  Weight: 152 lb   BMI Calculated: 26 5  BSA Calculated: 1 73    Physical Exam    Constitutional   General appearance: No acute distress, well appearing and well nourished  Head and Face   Palpation of the face and sinuses: No sinus tenderness  Eyes   Conjunctiva and lids: No swelling, erythema or discharge  Ears, Nose, Mouth, and Throat   Otoscopic examination: Tympanic membranes translucent with normal light reflex  Canals patent without erythema  Oropharynx: Normal with no erythema, edema, exudate or lesions  Neck   Neck: Supple, symmetric, trachea midline, no masses  Thyroid: Abnormal   left thyroid nodule  Pulmonary   Auscultation of lungs: Clear to auscultation  Cardiovascular   Auscultation of heart: Normal rate and rhythm, normal S1 and S2, no murmurs  Carotid pulses: 2+ bilaterally  no bruit heard over the right carotid and no bruit heard over the left carotid  Pedal pulses: 2+ bilaterally  Examination of extremities for edema and/or varicosities: Normal     Abdomen   Abdomen: Non-tender, no masses  Liver and spleen: No hepatomegaly or splenomegaly  Lymphatic   Palpation of lymph nodes in neck: No lymphadenopathy  no anterior cervical node enlargement, no posterior cervical node enlargement, no submandibular node enlargement and no supraclavicular node enlargement  Musculoskeletal   Gait and station: Normal     Digits and nails: Normal without clubbing or cyanosis  Skin   Skin and subcutaneous tissue: Normal without rashes or lesions      Psychiatric   Mood and affect: Normal        Results/Data  Coumadin Flow Sheet 54NDK2507 02:22PM      Test Name Result Flag Reference   Recheck INR 12JFC9643     Current Dose      5mg M-Sa and 2 5mg on Sunday   New Dose Same dose     Patient Notified      Patient and patient's daughter informed by Dr Chelsey Sargent     (1) PT WITH INR 92UPW2904 11:23AM Kennedyyvon Other     Test Name Result Flag Reference   INR 1 3 H    Reference Range                     0 9-1 1  Moderate-intensity Warfarin Therapy 2 0-3 0  Higher-intensity Warfarin Therapy   3 0-4 0   PT 13 3 sec H 9 0-11 5   For more information on this test, go to:  http://[employer]/faq/RNX287     (1) LEVETIRACETAM ( KEPPRA) 35CJL9300 11:28PM EPIC, Provider   Test ordered by: Yadi Lobe     Test Name Result Flag Reference   LEVETIRACETAM (KEPPRA) 16 0 ug/mL  10 0 - 40 0   Performed at:  [place] [address]  : Ruben Alfaro MD, Phone:  [phone]     (1) LIPID PANEL, FASTING 17TQT0031 [address] [place] [address]     Test Name Result Flag Reference   CHOLESTEROL, TOTAL 136 mg/dL  125-200   HDL CHOLESTEROL 59 mg/dL  > OR = 46   TRIGLICERIDES 78 mg/dL  <715   LDL-CHOLESTEROL 61 mg/dL (calc)  <130   Desirable range <100 mg/dL for patients with CHD or  diabetes and <70 mg/dL for diabetic patients with  known heart disease  CHOL/HDLC RATIO 2 3 (calc)  < OR = 5 0   NON HDL CHOLESTEROL 77 mg/dL (calc)     Target for non-HDL cholesterol is 30 mg/dL higher than   LDL cholesterol target       (1) HEPATIC FUNCTION PANEL 06Jun2017 07:40AM Dossie Romulus     Test Name Result Flag Reference   PROTEIN, TOTAL 6 5 g/dL  6 1-8 1   ALBUMIN 4 1 g/dL  3 6-5 1   GLOBULIN 2 4 g/dL (calc)  1 9-3 7   ALBUMIN/GLOBULIN RATIO 1 7 (calc)  1 0-2 5   BILIRUBIN, TOTAL 0 6 mg/dL  0 2-1 2   BILIRUBIN, DIRECT 0 2 mg/dL  < OR = 0 2   BILIRUBIN, INDIRECT 0 4 mg/dL (calc)  0 2-1 2   ALKALINE PHOSPHATASE 47 U/L     AST 22 U/L  10-35   ALT 18 U/L  6-29     (1) CK (CPK) 75AZY9037 07:40AM Weslye Manjit   REPORT COMMENT:  FASTING:YES     Test Name Result Flag Reference   CREATINE KINASE, TOTAL 69 U/L       (1) CBC/PLT/DIFF 76Vzb3573 08:21AM Retyson Salazar     Test Name Result Flag Reference   WHITE BLOOD CELL COUNT 6 0 Thousand/uL  3 8-10 8   RED BLOOD CELL COUNT 4 70 Million/uL  3 80-5 10   HEMOGLOBIN 15 1 g/dL  11 7-15 5   HEMATOCRIT 46 5 % H 35 0-45 0   MCV 99 0 fL  80 0-100 0   MCH 32 1 pg  27 0-33 0   MCHC 32 4 g/dL  32 0-36 0   RDW 15 1 % H 11 0-15 0   PLATELET COUNT 500 Thousand/uL  140-400   ABSOLUTE NEUTROPHILS 3972 cells/uL  2476-4968   ABSOLUTE LYMPHOCYTES 1488 cells/uL  850-3900   ABSOLUTE MONOCYTES 432 cells/uL  200-950   ABSOLUTE EOSINOPHILS 78 cells/uL     ABSOLUTE BASOPHILS 30 cells/uL  0-200   NEUTROPHILS 66 2 %     LYMPHOCYTES 24 8 %     MONOCYTES 7 2 %     EOSINOPHILS 1 3 %     BASOPHILS 0 5 %     MPV 8 2 fL  7 5-12 5     (1) COMPREHENSIVE METABOLIC PANEL 50HYE8933 48:51UL Ernie Salazar     Test Name Result Flag Reference   GLUCOSE 93 mg/dL  65-99   Fasting reference interval   UREA NITROGEN (BUN) 24 mg/dL  7-25   CREATININE 0 97 mg/dL H 0 60-0 88   For patients >52years of age, the reference limit  for Creatinine is approximately 13% higher for people  identified as -American  eGFR NON-AFR   AMERICAN 53 mL/min/1 73m2 L > OR = 60   eGFR AFRICAN AMERICAN 61 mL/min/1 73m2  > OR = 60   BUN/CREATININE RATIO 25 (calc) H 6-22   SODIUM 140 mmol/L  135-146   POTASSIUM 5 3 mmol/L  3 5-5 3   CHLORIDE 101 mmol/L     CARBON DIOXIDE 32 mmol/L H 20-31   CALCIUM 9 3 mg/dL  8 6-10 4   PROTEIN, TOTAL 6 5 g/dL  6 1-8 1   ALBUMIN 3 9 g/dL  3 6-5 1   GLOBULIN 2 6 g/dL (calc)  1 9-3 7   ALBUMIN/GLOBULIN RATIO 1 5 (calc)  1 0-2 5   BILIRUBIN, TOTAL 0 6 mg/dL  0 2-1 2   ALKALINE PHOSPHATASE 32 U/L L    AST 21 U/L  10-35   ALT 15 U/L  6-29     (Q) HEMOGLOBIN A1c 67Cva7545 08:21AM [facility]   REPORT COMMENT:  FASTING:YES     Test Name Result Flag Reference   HEMOGLOBIN A1c 6 1 % of total Hgb H <5 7   For someone without known diabetes, a hemoglobin   A1c value between 5 7% and 6 4% is consistent with  prediabetes and should be confirmed with a   follow-up test      For someone with known diabetes, a value <7%  indicates that their diabetes is well controlled  A1c  targets should be individualized based on duration of  diabetes, age, comorbid conditions, and other  considerations  This assay result is consistent with an increased risk  of diabetes  Currently, no consensus exists regarding use of  hemoglobin A1c for diagnosis of diabetes for children  * CT CHEST WO CONTRAST [id number] 09:41AM [facility]     Test Name Result Flag Reference   CT CHEST WO CONTRAST (Report)     CT CHEST WITHOUT IV CONTRAST     INDICATION: Follow-up lung nodule  History of lung carcinoma  COMPARISON: Most recent examination is 11/27/2015  TECHNIQUE: CT examination of the chest was performed without intravenous contrast  Axial, sagittal and coronal reformatted images were submitted for interpretation  Coronal thick section MIP (maximal intensity projection) images were also created  This examination, like all CT scans performed in the [facility], was performed utilizing techniques to minimize radiation dose exposure, including the use of iterative reconstruction and automated exposure control  FINDINGS:     LUNGS: Pleural-based nodule identified within the lateral aspect of the right lower lobe, image 33 measures 6 mm  This is unchanged  There is an additional pleural-based nodule slightly more superiorly on image 24 measuring 7 mm which is grossly    unchanged  These are unchanged from multiple prior examinations  Stable moderate to severe centrilobular emphysema  PLEURA: Unremarkable  HEART/GREAT VESSELS: Moderate atherosclerotic disease of the thoracic aorta diffusely   Mild aneurysmal dilatation of the descending aorta measuring 4 cm at the level of the right main pulmonary artery  Cardiac silhouette is within upper limits of    normal with stable coronary vascular calcification  MEDIASTINUM AND KRISTIE: Unremarkable  CHEST WALL AND LOWER NECK: There is a nodule identified within the enlarged left lobe of the thyroid gland which is unchanged from prior examination  VISUALIZED STRUCTURES IN THE UPPER ABDOMEN: Unremarkable  OSSEOUS STRUCTURES: Stable osteopenia  Stable endplate degenerative change of thoracic endplates  Stable mild compression deformity of the superior endplate of F07  IMPRESSION:     Stable examination  Moderate to severe centrilobular emphysema  2 pulmonary nodules are seen on the right which are pleural-based and unchanged from multiple prior examinations  No suspicious pulmonary nodules are identified  Mild aneurysmal dilatation of the ascending aorta which measures 4 cm at the level of the right main pulmonary artery  This is slightly larger than the prior examination which measured 3 8 cm  Nodule within the left lobe of the thyroid gland  Thyroid ultrasound was also performed today  Please see separate report  Workstation performed: [id number]     Signed by:   Sadi Butler DO   11/29/16     US THYROID [id number] 09:41AM Jaime Moscoso     Test Name Result Flag Reference   US THYROID (Report)     THYROID ULTRASOUND     INDICATION: 80year-old with history of RIGHT thyroidectomy  Follow-up LEFT nodules     COMPARISON: 2/1/2016 and multiple more remote priors  TECHNIQUE:  Ultrasound of the thyroid was performed with a high frequency linear transducer in transverse and sagittal planes including volumetric imaging sweeps as well as traditional still imaging technique  FINDINGS:   Normal homogeneous smooth echotexture  Right gland: Prior RIGHT thyroidectomy  Thyroidectomy bed within normal limits  Left gland: 5 9 x 2 9 x 3 5 cm  LEFT upper pole measuring 2 5 x 2 1 x 1 8 cm   Solid isoechoic nodule with coarse central calcification  Unchanged from prior    This nodule has been biopsied twice previously in 2011 and 2013  Mid to lower pole measuring 3 4 x 2 5 x 2 6 cm  Solid with central cystic components and coarse calcification  Unchanged from prior compared to recent priors  Decreased in size compared to 2012  Isthmus: 0 5 cm in AP dimension  No dominant nodules  IMPRESSION:      Prior RIGHT thyroidectomy  2 dominant LEFT thyroid nodules without suspicious changes  Workstation performed: [id number]     Signed by:   Jeet Schumacher MD   11/30/16     Health Management  Chronic obstructive pulmonary disease   Complete PFT; every 1 year; Next Due: 54OXW7689; Overdue  Type 2 diabetes mellitus   *VB - Eye Exam; every 1 year; Last 96BKD0128; Next Due: 51MVN9826; Overdue  Health Maintenance   BREAST EXAM; every 1 year; Next Due: 63Egb2897; Overdue  Medicare Annual Wellness Visit; every 1 year; Next Due: 59CCQ3242; Overdue    Message   Recorded as Task   Date: 08/14/2017 03:31 PM, Created By: System   Task Name: [facility]   Assigned To: True Valentin   Regarding Patient: Yumiko Oquendo, Status: Active   Comment:    System - 14 Aug 2017 3:31 PM     Patient discharged from hospital   Patient Name: Benito Moscoso  Patient YOB: 1930  Discharge Date: 8/14/2017  Facility: [facility] 15 Aug 2017 9:47 AM     TASK EDITED  spoke w/daughter pt scheduled for 08/17/2017     Future Appointments    Date/Time Provider Specialty Site   12/04/2017 10:45 AM MARYCARMEN Cameron  Thoracic Surgery [facility]   02/20/2018 10:40 AM MARYCARMEN Albarran  Endocrinology [facility]   05/22/2018 10:00 AM MARYCARMEN Fulton  Pulmonary Medicine [address] [facility]   09/28/2017 10:40 AM Isela Siegel MD Cardiology [facility] [place]   12/04/2017 01:00 PM MARYCARMEN Denson   Family Medicine [place] [id number] Electronically signed by : MARYCARMEN Barba ; Aug 17 2017  3:40PM EST                       (Author)

## 2018-01-13 NOTE — RESULT NOTES
Verified Results  (1) PT WITH INR 76Yfm1961 10:06AM Robbert Lights     Test Name Result Flag Reference   INR 3 0 H    Reference Range                     0 9-1 1  Moderate-intensity Warfarin Therapy 2 0-3 0  Higher-intensity Warfarin Therapy   3 0-4 0   PT 30 9 sec H 9 0-11 5   For more information on this test, go to:  http://[employer]/faq/RER752

## 2018-01-13 NOTE — RESULT NOTES
Verified Results  (1) PT WITH INR [id number] 10:04AM Nani Fay   REPORT COMMENT:  FASTING:UNKNOWN     Test Name Result Flag Reference   INR 1 5 H    Reference Range                     0 9-1 1  Moderate-intensity Warfarin Therapy 2 0-3 0  Higher-intensity Warfarin Therapy   3 0-4 0   PT 15 1 sec H 9 0-11 5   For more information on this test, go to:  [email or web address]       Signatures   Electronically signed by : MARYCARMEN Londono ; May 19 2017 12:34PM EST                       (Author)

## 2018-01-14 VITALS
SYSTOLIC BLOOD PRESSURE: 140 MMHG | DIASTOLIC BLOOD PRESSURE: 80 MMHG | WEIGHT: 154 LBS | HEART RATE: 63 BPM | HEIGHT: 65 IN | BODY MASS INDEX: 25.66 KG/M2

## 2018-01-14 VITALS
HEIGHT: 64 IN | RESPIRATION RATE: 16 BRPM | WEIGHT: 155.38 LBS | SYSTOLIC BLOOD PRESSURE: 162 MMHG | DIASTOLIC BLOOD PRESSURE: 100 MMHG | TEMPERATURE: 96.4 F | HEART RATE: 56 BPM | BODY MASS INDEX: 26.53 KG/M2

## 2018-01-15 NOTE — RESULT NOTES
Verified Results  (1) PT WITH INR [id number] 11:50AM Angélica Broussard     Test Name Result Flag Reference   INR 3 3 H    Reference Range                     0 9-1 1  Moderate-intensity Warfarin Therapy 2 0-3 0  Higher-intensity Warfarin Therapy   3 0-4 0   PT 33 2 sec H 9 0-11 5   For more information on this test, go to:  [email or web address]/[email or web address]

## 2018-01-15 NOTE — RESULT NOTES
Verified Results  (1) PT WITH INR [id number] 08:46AM Chelsy Coelho     Test Name Result Flag Reference   INR 2 2 H    Reference Range                     0 9-1 1  Moderate-intensity Warfarin Therapy 2 0-3 0  Higher-intensity Warfarin Therapy   3 0-4 0   PT 22 5 sec H 9 0-11 5   For more information on this test, go to:  [email or web address]/[email or web address]

## 2018-01-15 NOTE — RESULT NOTES
Message   upcoming appt     Verified Results  (1) BASIC METABOLIC PROFILE [id number] 08:19AM Samantha Basepeterson     Test Name Result Flag Reference   GLUCOSE 90 mg/dL  65-99   Fasting reference interval   UREA NITROGEN (BUN) 19 mg/dL  7-25   CREATININE 1 06 mg/dL H 0 60-0 88   For patients >52years of age, the reference limit  for Creatinine is approximately 13% higher for people  identified as -American  eGFR NON-AFR  AMERICAN 47 mL/min/1 73m2 L > OR = 60   eGFR AFRICAN AMERICAN 55 mL/min/1 73m2 L > OR = 60   BUN/CREATININE RATIO 18 (calc)  6-22   SODIUM 142 mmol/L  135-146   POTASSIUM 4 7 mmol/L  3 5-5 3   CHLORIDE 104 mmol/L     CARBON DIOXIDE 31 mmol/L  20-31   CALCIUM 9 6 mg/dL  8 6-10 4     (1) T4, FREE [id number] 08:19AM Michaela Bertrand     Test Name Result Flag Reference   T4, FREE 1 1 ng/dL  0 8-1 8     (Q) TSH, 3RD GENERATION [id number] 08:19AM Samantha Baseman     Test Name Result Flag Reference   TSH 5 56 mIU/L H 0 40-4 50     *(Q) VITAMIN D, 25-HYDROXY, LC/MS/MS [id number] 08:19AM Michaela Bertrand   REPORT COMMENT:  FASTING:YES     Test Name Result Flag Reference   VITAMIN D, 25-OH, TOTAL 40 ng/mL     Vitamin D Status         25-OH Vitamin D:     Deficiency:                    <20 ng/mL  Insufficiency:             20 - 29 ng/mL  Optimal:                 > or = 30 ng/mL     For 25-OH Vitamin D testing on patients on   D2-supplementation and patients for whom quantitation   of D2 and D3 fractions is required, the QuestAssureD(TM)  25-OH VIT D, (D2,D3), LC/MS/MS is recommended: order   code 01064 (patients >2yrs)  For more information on this test, go to:  http://[email or web address]/faq/[email or web address]  (This link is being provided for   informational/educational purposes only )

## 2018-01-16 NOTE — RESULT NOTES
Verified Results  (1) PT WITH INR [id number] 10:04AM [facility]   REPORT COMMENT:  FASTING:UNKNOWN     Test Name Result Flag Reference   INR 2 3 H    Reference Range                     0 9-1 1  Moderate-intensity Warfarin Therapy 2 0-3 0  Higher-intensity Warfarin Therapy   3 0-4 0   PT 23 1 sec H 9 0-11 5   For more information on this test, go to:  [email or web address]/[email or web address]

## 2018-01-16 NOTE — RESULT NOTES
Verified Results  (1) PT WITH INR [id number] 10:55AM Jonelle Leonard   REPORT COMMENT:  FASTING:UNKNOWN     Test Name Result Flag Reference   INR 2 2 H    Reference Range                     0 9-1 1  Moderate-intensity Warfarin Therapy 2 0-3 0  Higher-intensity Warfarin Therapy   3 0-4 0   PT 21 8 sec H 9 0-11 5   For more information on this test, go to:  [email or web address]/faq/[email or web address]

## 2018-01-16 NOTE — RESULT NOTES
Message  Willie Laura I have enclosed a copy of your most recent labs  all results are normal  Normal range for creatinine can be normal up to 1 30  your blood sugars are well controlled  A1c 6 2 normal less than 7  this test reflects diabetes control over the last 3 months  Cholesterol normal at 126  Continue with current medications      Verified Results  (1) CBC/PLT/DIFF [id number] 08:17AM Robbert Lights     Test Name Result Flag Reference   WHITE BLOOD CELL COUNT 4 7 Thousand/uL  3 8-10 8   RED BLOOD CELL COUNT 4 56 Million/uL  3 80-5 10   HEMOGLOBIN 14 3 g/dL  11 7-15 5   HEMATOCRIT 44 9 %  35 0-45 0   MCV 98 4 fL  80 0-100 0   MCH 31 4 pg  27 0-33 0   MCHC 31 9 g/dL L 32 0-36 0   RDW 15 7 % H 11 0-15 0   PLATELET COUNT 708 Thousand/uL  140-400   MPV 9 3 fL  7 5-11 5   ABSOLUTE NEUTROPHILS 2679 cells/uL  7316-3940   ABSOLUTE LYMPHOCYTES 1570 cells/uL  850-3900   ABSOLUTE MONOCYTES 348 cells/uL  200-950   ABSOLUTE EOSINOPHILS 85 cells/uL     ABSOLUTE BASOPHILS 19 cells/uL  0-200   NEUTROPHILS 57 0 %     LYMPHOCYTES 33 4 %     MONOCYTES 7 4 %     EOSINOPHILS 1 8 %     BASOPHILS 0 4 %       (1) COMPREHENSIVE METABOLIC PANEL [id number] 95:34FQ Robbert Lights     Test Name Result Flag Reference   GLUCOSE 92 mg/dL  65-99   Fasting reference interval   UREA NITROGEN (BUN) 20 mg/dL  7-25   CREATININE 0 99 mg/dL H 0 60-0 88   For patients >52years of age, the reference limit  for Creatinine is approximately 13% higher for people  identified as -American  eGFR NON-AFR   AMERICAN 52 mL/min/1 73m2 L > OR = 60   eGFR AFRICAN AMERICAN 60 mL/min/1 73m2  > OR = 60   BUN/CREATININE RATIO 20 (calc)  6-22   SODIUM 139 mmol/L  135-146   POTASSIUM 4 3 mmol/L  3 5-5 3   CHLORIDE 101 mmol/L     CARBON DIOXIDE 27 mmol/L  19-30   CALCIUM 9 0 mg/dL  8 6-10 4   PROTEIN, TOTAL 6 4 g/dL  6 1-8 1   ALBUMIN 4 0 g/dL  3 6-5 1   GLOBULIN 2 4 g/dL (calc)  1 9-3 7   ALBUMIN/GLOBULIN RATIO 1 7 (calc)  1 0-2 5   BILIRUBIN, TOTAL 0 6 mg/dL  0 2-1 2   ALKALINE PHOSPHATASE 24 U/L L    Verified by repeat analysis  AST 21 U/L  10-35   ALT 14 U/L  6-29     (1) LIPID PANEL, FASTING [id number] 08:17AM Phyllis Grubbstz     Test Name Result Flag Reference   CHOLESTEROL, TOTAL 126 mg/dL  125-200   HDL CHOLESTEROL 51 mg/dL  > OR = 46   TRIGLICERIDES 92 mg/dL  <873   LDL-CHOLESTEROL 57 mg/dL (calc)  <130   Desirable range <100 mg/dL for patients with CHD or  diabetes and <70 mg/dL for diabetic patients with  known heart disease  CHOL/HDLC RATIO 2 5 (calc)  < OR = 5 0   NON HDL CHOLESTEROL 75 mg/dL (calc)     Target for non-HDL cholesterol is 30 mg/dL higher than   LDL cholesterol target  (Q) MICROALBUMIN, RANDOM URINE (W/CREATININE) [id number] 08:17AM Phyllis Tre     Test Name Result Flag Reference   CREATININE, RANDOM URINE 38 mg/dL     MICROALBUMIN 1 0 mg/dL     Reference Range  Not established   MICROALBUMIN/CREATININE$RATIO, RANDOM URINE 26 mcg/mg creat  <30   The ADA defines abnormalities in albumin  excretion as follows:     Category         Result (mcg/mg creatinine)     Normal                    <30  Microalbuminuria            Clinical albuminuria   > OR = 300     The ADA recommends that at least two of three  specimens collected within a 3-6 month period be  abnormal before considering a patient to be  within a diagnostic category  (Q) HEMOGLOBIN A1c [id number] 08:17AM Phyllis Tre   REPORT COMMENT:  FASTING:YES     Test Name Result Flag Reference   HEMOGLOBIN A1c 6 2 % of total Hgb H <5 7   According to ADA guidelines, hemoglobin A1c <7 0%  represents optimal control in non-pregnant diabetic  patients  Different metrics may apply to specific  patient populations  Standards of Medical Care in  [phone]  Diabetes Care   2013;36:s11-s66     For the purpose of screening for the presence of  diabetes  <5 7%       Consistent with the absence of diabetes  5 7-6 4%    Consistent with increased risk for diabetes (prediabetes)  >or=6 5%    Consistent with diabetes     This assay result is consistent with a higher risk  of diabetes  Currently, no consensus exists for use of hemoglobin  A1c for diagnosis of diabetes for children  Results/Data  Results    (1) CBC/PLT/DIFF   WHITE BLOOD CELL COUNT: 4 7 Thousand/uL Reference Range 3 8-10 8  RED BLOOD CELL COUNT: 4 56 Million/uL Reference Range 3 80-5 10  HEMOGLOBIN: 14 3 g/dL Reference Range 11 7-15 5  HEMATOCRIT: 44 9 % Reference Range 35 0-45 0  MCV: 98 4 fL Reference Range 80 0-100 0  MCH: 31 4 pg Reference Range 27 0-33 0  MCHC: 31 9 g/dL Abnormal Low Reference Range 32 0-36 0  RDW: 15 7 % Abnormal High Reference Range 11 0-15 0  PLATELET COUNT: 088 Thousand/uL Reference Range 140-400  MPV: 9 3 fL Reference Range 7 5-11 5  ABSOLUTE NEUTROPHILS: 2679 cells/uL Reference Range 3354-5549  ABSOLUTE LYMPHOCYTES: 1570 cells/uL Reference Range 850-3900  ABSOLUTE MONOCYTES: 348 cells/uL Reference Range 200-950  ABSOLUTE EOSINOPHILS: 85 cells/uL Reference Range   ABSOLUTE BASOPHILS: 19 cells/uL Reference Range 0-200  NEUTROPHILS: 57 0 %  LYMPHOCYTES: 33 4 %  MONOCYTES: 7 4 %  EOSINOPHILS: 1 8 %  BASOPHILS: 0 4 %  (1) COMPREHENSIVE METABOLIC PANEL   GLUCOSE: 92 mg/dL Reference Range 65-99  UREA NITROGEN (BUN): 20 mg/dL Reference Range 7-25  CREATININE: 0 99 mg/dL Abnormal High Reference Range 0 60-0 88  eGFR NON-AFR   AMERICAN: 52 mL/min/1 73m2 Abnormal Low Reference Range >  OR = 60  eGFR : 60 mL/min/1 73m2 Reference Range > OR = 60  BUN/CREATININE RATIO: 20 (calc) Reference Range 6-22  SODIUM: 139 mmol/L Reference Range 135-146  POTASSIUM: 4 3 mmol/L Reference Range 3 5-5 3  CHLORIDE: 101 mmol/L Reference Range   CARBON DIOXIDE: 27 mmol/L Reference Range 19-30  CALCIUM: 9 0 mg/dL Reference Range 8 6-10 4  PROTEIN, TOTAL: 6 4 g/dL Reference Range 6 1-8 1  ALBUMIN: 4 0 g/dL Reference Range 3 6-5 1  GLOBULIN: 2 4 g/dL (calc) Reference Range 1 9-3  7  ALBUMIN/GLOBULIN RATIO: 1 7 (calc) Reference Range 1 0-2 5  BILIRUBIN, TOTAL: 0 6 mg/dL Reference Range 0 2-1 2  ALKALINE PHOSPHATASE: 24 U/L Abnormal Low Reference Range   AST: 21 U/L Reference Range 10-35  ALT: 14 U/L Reference Range 6-29  (1) LIPID PANEL, FASTING   CHOLESTEROL, TOTAL: 126 mg/dL Reference Range 125-200  HDL CHOLESTEROL: 51 mg/dL Reference Range > OR = 46  TRIGLICERIDES: 92 mg/dL Reference Range <150  LDL-CHOLESTEROL: 57 mg/dL (calc) Reference Range <130  CHOL/HDLC RATIO: 2 5 (calc) Reference Range < OR = 5 0  NON HDL CHOLESTEROL: 75 mg/dL (calc)  (Q) MICROALBUMIN, RANDOM URINE (W/CREATININE)   CREATININE, RANDOM URINE: 38 mg/dL Reference Range   MICROALBUMIN: 1 0 mg/dL  MICROALBUMIN/CREATININE$RATIO, RANDOM URINE: 26 mcg/mg creat Reference  Range <30  (Q) HEMOGLOBIN A1c   HEMOGLOBIN A1c: 6 2 % of total Hgb Abnormal High Reference Range <5 7    Signatures   Electronically signed by : MARYCARMEN Crowell ; Jun 17 2016  6:03PM EST                       (Author)

## 2018-01-16 NOTE — RESULT NOTES
Verified Results  (1) PT WITH INR [id number] 09:33AM Pavithra Blush     Test Name Result Flag Reference   INR 2 2 H    Reference Range                     0 9-1 1  Moderate-intensity Warfarin Therapy 2 0-3 0  Higher-intensity Warfarin Therapy   3 0-4 0   PT 22 9 sec H 9 0-11 5   For more information on this test, go to:  [email or web address]

## 2018-01-16 NOTE — RESULT NOTES
Message  Cinthya Bustamante I have enclosed a copy of your recent labs  all results are within normal limits  continue with your current medications  Verified Results  (1) CBC/PLT/DIFF [id number] 08:21AM Gavin Crumble     Test Name Result Flag Reference   WHITE BLOOD CELL COUNT 6 0 Thousand/uL  3 8-10 8   RED BLOOD CELL COUNT 4 70 Million/uL  3 80-5 10   HEMOGLOBIN 15 1 g/dL  11 7-15 5   HEMATOCRIT 46 5 % H 35 0-45 0   MCV 99 0 fL  80 0-100 0   MCH 32 1 pg  27 0-33 0   MCHC 32 4 g/dL  32 0-36 0   RDW 15 1 % H 11 0-15 0   PLATELET COUNT 083 Thousand/uL  140-400   MPV 8 2 fL  7 5-12 5   ABSOLUTE NEUTROPHILS 3972 cells/uL  2495-7639   ABSOLUTE LYMPHOCYTES 1488 cells/uL  850-3900   ABSOLUTE MONOCYTES 432 cells/uL  200-950   ABSOLUTE EOSINOPHILS 78 cells/uL     ABSOLUTE BASOPHILS 30 cells/uL  0-200   NEUTROPHILS 66 2 %     LYMPHOCYTES 24 8 %     MONOCYTES 7 2 %     EOSINOPHILS 1 3 %     BASOPHILS 0 5 %       (1) COMPREHENSIVE METABOLIC PANEL [id number] 98:03FU Gavin Cabralesumble     Test Name Result Flag Reference   GLUCOSE 93 mg/dL  65-99   Fasting reference interval   UREA NITROGEN (BUN) 24 mg/dL  7-25   CREATININE 0 97 mg/dL H 0 60-0 88   For patients >52years of age, the reference limit  for Creatinine is approximately 13% higher for people  identified as -American  eGFR NON-AFR   AMERICAN 53 mL/min/1 73m2 L > OR = 60   eGFR AFRICAN AMERICAN 61 mL/min/1 73m2  > OR = 60   BUN/CREATININE RATIO 25 (calc) H 6-22   SODIUM 140 mmol/L  135-146   POTASSIUM 5 3 mmol/L  3 5-5 3   CHLORIDE 101 mmol/L     CARBON DIOXIDE 32 mmol/L H 20-31   CALCIUM 9 3 mg/dL  8 6-10 4   PROTEIN, TOTAL 6 5 g/dL  6 1-8 1   ALBUMIN 3 9 g/dL  3 6-5 1   GLOBULIN 2 6 g/dL (calc)  1 9-3 7   ALBUMIN/GLOBULIN RATIO 1 5 (calc)  1 0-2 5   BILIRUBIN, TOTAL 0 6 mg/dL  0 2-1 2   ALKALINE PHOSPHATASE 32 U/L L    AST 21 U/L  10-35   ALT 15 U/L  6-29     (1) LIPID PANEL, FASTING [id number] 08:21AM Wauchula Crumble     Test Name Result Flag Reference CHOLESTEROL, TOTAL 123 mg/dL L 125-200   HDL CHOLESTEROL 43 mg/dL L > OR = 46   TRIGLICERIDES 88 mg/dL  <129   LDL-CHOLESTEROL 62 mg/dL (calc)  <130   Desirable range <100 mg/dL for patients with CHD or  diabetes and <70 mg/dL for diabetic patients with  known heart disease  CHOL/HDLC RATIO 2 9 (calc)  < OR = 5 0   NON HDL CHOLESTEROL 80 mg/dL (calc)     Target for non-HDL cholesterol is 30 mg/dL higher than   LDL cholesterol target  (Q) HEMOGLOBIN A1c [id number] 08:21AM Millicent Monzon   REPORT COMMENT:  FASTING:YES     Test Name Result Flag Reference   HEMOGLOBIN A1c 6 1 % of total Hgb H <5 7   For someone without known diabetes, a hemoglobin   A1c value between 5 7% and 6 4% is consistent with  prediabetes and should be confirmed with a   follow-up test      For someone with known diabetes, a value <7%  indicates that their diabetes is well controlled  A1c  targets should be individualized based on duration of  diabetes, age, comorbid conditions, and other  considerations  This assay result is consistent with an increased risk  of diabetes  Currently, no consensus exists regarding use of  hemoglobin A1c for diagnosis of diabetes for children         Signatures   Electronically signed by : MARYCARMEN Tidwell ; Apr 17 2017  9:46AM EST                       (Author)

## 2018-01-16 NOTE — RESULT NOTES
Verified Results  (1) PT WITH INR [id number] 01:31PM Norma Cooper   REPORT COMMENT:  FASTING:UNKNOWN     Test Name Result Flag Reference   INR 2 6 H    Reference Range                     0 9-1 1  Moderate-intensity Warfarin Therapy 2 0-3 0  Higher-intensity Warfarin Therapy   3 0-4 0   PT 26 1 sec H 9 0-11 5   For more information on this test, go to:  [email or web address]       Signatures   Electronically signed by : ANNIE Lopez ; Feb 2 2016  8:12AM EST                       (Author)

## 2018-01-17 ENCOUNTER — LAB CONVERSION - ENCOUNTER (OUTPATIENT)
Dept: OTHER | Facility: OTHER | Age: 83
End: 2018-01-17

## 2018-01-17 ENCOUNTER — GENERIC CONVERSION - ENCOUNTER (OUTPATIENT)
Dept: OTHER | Facility: OTHER | Age: 83
End: 2018-01-17

## 2018-01-17 LAB
INR PPP: 2.6
PROTHROMBIN TIME: 27 SEC (ref 9–11.5)

## 2018-01-17 NOTE — RESULT NOTES
Verified Results  (1) PT WITH INR [id number] 07:36AM Alfredo Alvarado   REPORT COMMENT:  FASTING:UNKNOWN     Test Name Result Flag Reference   INR 1 3 H    Reference Range                     0 9-1 1  Moderate-intensity Warfarin Therapy 2 0-3 0  Higher-intensity Warfarin Therapy   3 0-4 0   PT 13 6 sec H 9 0-11 5   For more information on this test, go to:  [email or web address]

## 2018-01-17 NOTE — RESULT NOTES
Verified Results  (1) PT WITH INR [id number] 09:58AM Nani Fay   REPORT COMMENT:  FASTING:UNKNOWN     Test Name Result Flag Reference   INR 1 9 H    Reference Range                     0 9-1 1  Moderate-intensity Warfarin Therapy 2 0-3 0  Higher-intensity Warfarin Therapy   3 0-4 0   PT 19 0 sec H 9 0-11 5   For more information on this test, go to:  [email or web address]

## 2018-01-18 NOTE — RESULT NOTES
Verified Results  (1) PT WITH INR [id number] 01:33PM Avie Crew   REPORT COMMENT:  FASTING:UNKNOWN     Test Name Result Flag Reference   INR 2 4 H    Reference Range                     0 9-1 1  Moderate-intensity Warfarin Therapy 2 0-3 0  Higher-intensity Warfarin Therapy   3 0-4 0   PT 24 2 sec H 9 0-11 5   For more information on this test, go to:  [email or web address]

## 2018-01-18 NOTE — RESULT NOTES
Verified Results  (1) PT WITH INR [id number] 11:18AM Latricia Pitts   REPORT COMMENT:  FASTING:UNKNOWN     Test Name Result Flag Reference   INR 1 6 H    Reference Range                     0 9-1 1  Moderate-intensity Warfarin Therapy 2 0-3 0  Higher-intensity Warfarin Therapy   3 0-4 0   PT 16 0 sec H 9 0-11 5   For more information on this test, go to:  [email or web address]/[email or web address]       Plan  Need for prophylactic vaccination and inoculation against influenza    · Fluzone High-Dose 0 5 ML Intramuscular Suspension Prefilled Syringe

## 2018-01-22 VITALS
BODY MASS INDEX: 25.95 KG/M2 | WEIGHT: 152 LBS | TEMPERATURE: 97.2 F | DIASTOLIC BLOOD PRESSURE: 78 MMHG | RESPIRATION RATE: 18 BRPM | HEIGHT: 64 IN | SYSTOLIC BLOOD PRESSURE: 132 MMHG | HEART RATE: 78 BPM

## 2018-01-22 VITALS
WEIGHT: 156 LBS | HEART RATE: 66 BPM | SYSTOLIC BLOOD PRESSURE: 120 MMHG | DIASTOLIC BLOOD PRESSURE: 80 MMHG | HEIGHT: 64 IN | BODY MASS INDEX: 26.63 KG/M2 | OXYGEN SATURATION: 94 %

## 2018-01-22 VITALS
HEIGHT: 65 IN | WEIGHT: 155 LBS | BODY MASS INDEX: 25.83 KG/M2 | SYSTOLIC BLOOD PRESSURE: 116 MMHG | OXYGEN SATURATION: 96 % | HEART RATE: 78 BPM | DIASTOLIC BLOOD PRESSURE: 70 MMHG

## 2018-01-23 NOTE — RESULT NOTES
Verified Results  (1) PT WITH INR [id number] 10:51AM Fayrene Grout     Test Name Result Flag Reference   INR 1 7 H    Reference Range                     0 9-1 1  Moderate-intensity Warfarin Therapy 2 0-3 0  Higher-intensity Warfarin Therapy   3 0-4 0   PT 17 8 sec H 9 0-11 5   For more information on this test, go to:  [email or web address]

## 2018-01-23 NOTE — RESULT NOTES
Verified Results  (1) PT WITH INR [id number] 10:22AM Shavon Carranza     Test Name Result Flag Reference   INR 2 1 H    Reference Range                     0 9-1 1  Moderate-intensity Warfarin Therapy 2 0-3 0  Higher-intensity Warfarin Therapy   3 0-4 0   PT 21 0 sec H 9 0-11 5   For more information on this test, go to:  [email or web address]

## 2018-01-23 NOTE — RESULT NOTES
Verified Results  (1) PT WITH INR [id number] 10:43AM Edison Gan   REPORT COMMENT:  FASTING:NO     Test Name Result Flag Reference   INR 2 6 H    Reference Range                     0 9-1 1  Moderate-intensity Warfarin Therapy 2 0-3 0  Higher-intensity Warfarin Therapy   3 0-4 0   PT 27 0 sec H 9 0-11 5   For more information on this test, go to:  [email or web address]

## 2018-01-24 VITALS
HEART RATE: 80 BPM | OXYGEN SATURATION: 92 % | BODY MASS INDEX: 26.35 KG/M2 | DIASTOLIC BLOOD PRESSURE: 84 MMHG | SYSTOLIC BLOOD PRESSURE: 114 MMHG | HEIGHT: 65 IN | WEIGHT: 158.13 LBS

## 2018-01-24 VITALS
OXYGEN SATURATION: 92 % | HEART RATE: 81 BPM | DIASTOLIC BLOOD PRESSURE: 74 MMHG | SYSTOLIC BLOOD PRESSURE: 145 MMHG | WEIGHT: 156.38 LBS | BODY MASS INDEX: 26.05 KG/M2 | HEIGHT: 65 IN | TEMPERATURE: 98.2 F

## 2018-01-24 VITALS
RESPIRATION RATE: 16 BRPM | TEMPERATURE: 97.4 F | WEIGHT: 156 LBS | BODY MASS INDEX: 25.99 KG/M2 | SYSTOLIC BLOOD PRESSURE: 118 MMHG | HEIGHT: 65 IN | HEART RATE: 68 BPM | DIASTOLIC BLOOD PRESSURE: 74 MMHG

## 2018-01-30 NOTE — PROGRESS NOTES
Assessment    1  Hypertension (401 9) (I10)   2  Type 2 diabetes mellitus (250 00) (E11 9)   3  Mixed hyperlipidemia (272 2) (E78 2)   4  Elevated TSH (794 5) (R94 6)   5  Nontoxic single thyroid nodule (241 0) (E04 1)   6  Chronic diastolic congestive heart failure (428 32,428 0) (I50 32)   7  Paroxysmal atrial fibrillation (427 31) (I48 0)   8  Pulmonary hypertension (416 8) (I27 20)   9  Squamous cell carcinoma of right lung (162 9) (C34 91)   10  Obstructive sleep apnea on CPAP (327 23,V46 8) (G47 33,Z99 89)   11  Seizure disorder (345 90) (G40 909)    Plan  Elevated TSH, Hypertension, Mixed hyperlipidemia, Nontoxic single thyroid nodule,  Osteoporosis, Paroxysmal atrial fibrillation, Type 2 diabetes mellitus    · (1) CBC/PLT/DIFF; Status:Active; Requested for:89Uug5655;    · (1) COMPREHENSIVE METABOLIC PANEL; Status:Active; Requested for:46Lcq5228;    · (1) HEMOGLOBIN A1C; Status:Active; Requested for:20Zwq5926;    · (1) LIPID PANEL, FASTING; Status:Active; Requested for:79Xxt5726; Discussion/Summary    Continue with current medications  Follow up with various specialists  Endocrinology evaluation next year  no treatment at this time for subclinical hypothyroidism  Up-to-date with flu vaccine and pneumococcal vaccines  Office visit in 6 months with repeat labs at that time  Impression: Subsequent Annual Wellness Visit, with preventive exam as well as age and risk appropriate counseling completed  Cardiovascular screening and counseling: screening not indicated, counseling was given on maintaining a healthy diet and counseling was given on maintaining a healthy weight  Diabetes screening and counseling: N/A  Colorectal cancer screening and counseling: screening not indicated  Cervical cancer screening and counseling: the patient declines screening  Osteoporosis screening and counseling: screening is current  Abdominal aortic aneurysm screening and counseling: screening not indicated  Glaucoma screening and counseling: screening is current  Immunizations: influenza vaccine is up to date this year and the lifetime pneumococcal vaccine has been completed  Advice and education were given regarding fall risk reduction, increasing physical activity and nutrition (non-diabetic)  She was referred to cardiology, endocrinology and pulmonology  Patient Discussion: follow-up visit needed in 6 months   Possible side effects of new medications were reviewed with the patient/guardian today  The treatment plan was reviewed with the patient/guardian  The patient/guardian understands and agrees with the treatment plan      History of Present Illness  HPI: follow up visit  medications reviewed  labs 11/2017 see note  Type 2 diabetes mellitus A1c 5 5  Urine microalbumin 6 0  on ARB  no hypoglycemic events  No neuropathy symptoms  Current with eye exam  Hypertension blood pressure stable on current regimen  creatinine 0 85  Electrolytes normal  Hgb 16 0  Hyperlipidemia on Pravastatin 80 mg daily  lipid profile cholesterol 155  Triglycerides 115  HDL 62  LDL 73  LFTs normal  s/p admission 08/2017 for acute on chronic diastolic CHF  pulse oximetry at home 79%  known history of COPD with use of oxygen at nighttime  Squamous cell carcinoma low status post right upper lobectomy in 2009  work up in the hospital  CXR  BNP elevated at 2,148  CT chest no PE  moderate centrilobular emphysema  7 mm right lower lobe nodule increased in size  Scattered subcentimeter mediastinal and hilar lymph nodes  Mild cardiomegaly  No pericardial mass or effusion  Atherosclerotic changes in the coronary arteries and aorta  Echocardiogram normal left ventricular systolic function  EF 55 to 65% range  No regional wall motion abnormalities  Mild concentric hypertrophy  Grade 2 diastolic dysfunction  Mildly dilated right and left atrium  Moderate mitral regurgitation  Moderate tricuspid regurgitation  Severe pulmonary hypertension   No pericardial effusion  Labs random blood glucose 99  Electrolytes normal except for bicarbonate 33  Magnesium 1 9  Creatinine 0 5  LFTs normal  Calcium 9 5  Troponin less than 0 02  INR 3 2  CBC WBC 7,890  Hemoglobin 15 1  Platelet count 333,899  Lactic acid normal at 0 9  TSH 3 050  s/p admission 09/2016 for acute exacerbation COPD and tracheobronchitis  CXR no infiltrates  + ILD  admission labs CBC normal except for WBC 10 220  Hgb 14  3  platelets 427,917  BMP normal except for bicarb 34  K+ 4 2  creatinine 1 12  admission 11/2015 for acute on chronic diastolic CHF  CXR bilateral pleural effusions  interstitial  echocardiogram normal LV systolic function  EF 65 %  moderate TR  severe pulmonary hypertension  Welcome to [employer] and Wellness Visits: The patient is being seen for the subsequent annual wellness visit  Medicare Screening and Risk Factors   Hospitalizations: she has been previously hospitalizied  Once per lifetime medicare screening tests: ECG  Medicare Screening Tests Risk Questions   Abdominal aortic aneurysm risk assessment: none indicated  Osteoporosis risk assessment: , female gender and over 48years of age  Drug and Alcohol Use: The patient is a former cigarette smoker and quit smoking 1985  The patient reports never drinking alcohol  She has never used illicit drugs  Diet and Physical Activity: Current diet includes well balanced meals, low salt food choices, 1-2 servings of fruit per day, 1 servings of vegetables per day, 2 servings of meat per day, 2 servings of whole grains per day, 1 servings of dairy products per day, 2 cups of coffee per day and 1 cans of diet soda per day  She exercises daily  Exercise: walking 10 minutes per day  Mood Disorder and Cognitive Impairment Screening: PHQ-9 Depression Scale   Over the past 2 weeks, how often have you been bothered by the following problems? 1 ) Little interest or pleasure in doing things?  Not at all    2 ) Feeling down, depressed or hopeless? Not at all    4 ) Feeling tired or having little energy? Not at all    5 ) Poor appetite or overeating? Not at all    6 ) Feeling bad about yourself, or that you are a failure, or have let yourself or your family down? Not at all    7 ) Trouble concentrating on things, such as reading a newspaper or watching television? Several days  8 ) Moving or speaking so slowly that other people could have noticed, or the opposite, moving or speaking faster than usual? Not at all  TOTAL SCORE: 1  How difficult have these problems made it for you to do your work, take care of things at home, or get along with people? Not at all  Cognitive impairment screening: denies difficulty learning/retaining new information, difficulty handling complex tasks, denies difficulty with reasoning, denies difficulty with spatial ability and orientation, denies difficulty with language and denies difficulty with behavior  Functional Ability/Level of Safety: She reports hearing difficulties  She does not use a hearing aid  The patient is currently able to do instrumental activities of daily living with limitations and unable to drive, but able to do activities of daily living without limitations  Activities of daily living details: transportation help needed and needs help managing money  Fall risk factors: The patient fell 0 times in the past 12 months  Injury History: polypharmacy and urinary incontinence  Advance Directives: Advance directives: durable power of  for health care directives, but no living will and no advance directives  Co-Managers and Medical Equipment/Suppliers: See Patient Care Team      Patient Care Team    Care Team Member Role Specialty Office Number   Sandro Hawkins MD Specialist Cardiology [phone]   Diego Stover MD  Thoracic Surgery [phone]   Bhakti BRODERICK  Family Medicine [phone]   Delmy BRODERICK    Endocrinology [phone]   Oli Garza Ines BRODERICK  Pulmonary Medicine [phone]   Junaid BRODERICK  Thoracic Surgery [phone]     Review of Systems    Constitutional: feeling tired, but no fever, no recent weight gain, no chills and no recent weight loss  Eyes: no eyesight problems  ENT: history of left thyroid nodule biopsy x 2 negative  she is followed by endocrinology  , but no sore throat and no hoarseness  Cardiovascular: PAF on Warfarin, low dose beta blocker and digoxin  , but no chest pain, no palpitations and no lower extremity edema  Respiratory: COPD on daily Spiriva and prn Albuterol  squamous cell CA s/p RU lobectomy in 2009  she is followed by thoracic surgery  CT scan chest 11/2017 Stable pulmonary nodules measuring up to 7 mm no change from 2014  Stable left thyroid nodule  Centrilobular emphysematous changes  CT scan chest 11/2016 no evidence of recurrence  Moderate to severe centrilobular emphysema  2 pulmonary nodules seen on right pleural-based and unchanged from previous studies  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 at the level of the right main pulmonary artery    RUBY stable on CPAP  on oxygen at HS  , but no cough, no orthopnea, no wheezing, no shortness of breath during exertion and no PND  Gastrointestinal: no abdominal pain, no nausea, no vomiting, no constipation and no diarrhea  Genitourinary: no dysuria and no incontinence  Musculoskeletal: arthralgias and OA left knee  she has been using prn Tylenol for pain  s/p right THR  , but no myalgias  Neurological: no recent falls  history of seizure disorder on Keppra  08/2017 Keppra level 16 normal 10 to 40, but no headache, no dizziness and no difficulty walking  Psychiatric: sleep disturbances and chronic insomnia on Trazodone  Endocrine: 11/2017 TSH 5 82  free T4 normal at 1 2 02/2017 TSH mildly elevated at 5 56  not on medication  02/2016 TSH 2 90  osteoporosis on Ca++ and vitamin D supplement  02/2017 vitamin D level 40  Active Problems    1  Advanced directives, counseling/discussion (V65 49) (Z71 89)   2  Anticoagulant long-term use (V58 61) (Z79 01)   3  Chronic diastolic congestive heart failure (428 32,428 0) (I50 32)   4  CT Lung Pulmonary Nodule (< 6cm )   5  Depression (311) (F32 9)   6  Elevated TSH (794 5) (R94 6)   7  Fatty liver (571 8) (K76 0)   8  Hypertension (401 9) (I10)   9  Hypoxia (799 02) (R09 02)   10  Insomnia (780 52) (G47 00)   11  Lung nodule (793 11) (R91 1)   12  Mitral regurgitation (424 0) (I34 0)   13  Mixed hyperlipidemia (272 2) (E78 2)   14  Non-toxic multinodular goiter (241 1) (E04 2)   15  Nontoxic single thyroid nodule (241 0) (E04 1)   16  Obstructive sleep apnea on CPAP (327 23,V46 8) (G47 33,Z99 89)   17  Osteoporosis (733 00) (M81 0)   18  Paroxysmal atrial fibrillation (427 31) (I48 0)   19  Pulmonary hypertension (416 8) (I27 20)   20  Pulmonary nodules (793 19) (R91 8)   21  Screening for genitourinary condition (V81 6) (Z13 89)   22  Seizure disorder (345 90) (G40 909)   23  Squamous cell carcinoma of right lung (162 9) (C34 91)   24   Type 2 diabetes mellitus (250 00) (E11 9)    Past Medical History    · History of Acute exacerbation of chronic obstructive pulmonary disease (COPD) (491 21)  (J44 1)   · History of Acute on chronic diastolic congestive heart failure (428 33,428 0) (I50 33)   · History of Cataract, right eye (366 9) (H26 9)   · History of actinic keratosis (V13 3) (Z87 2)   · History of anemia (V12 3) (Z86 2)   · History of stroke without residual deficits (V12 54) (Z86 73)   · History of Pneumonia (486) (J18 9)   · History of Pulmonary Embolism (V12 51)   · History of Trochanteric bursitis of left hip (726 5) (M70 62)    Surgical History    · History of Cholecystectomy   · History of Craniotomy (Therapeutic)   · History of Thyroid Surgery Sub-Total Thyroidectomy   · History of Tonsillectomy   · History of Treatment Of Hip Fracture    Family History  Mother    · Family history of Diabetes Mellitus (V18 0)   · Family history of Reported Family History Of Heart Disease  Father    · Family history of Cancer  Family History    · Family history of cerebrovascular accident (V17 1) (Z82 3)   · Family history of hypertension (V17 49) (Z82 49)   · Family history of sudden cardiac death (SCD) (V17 41) (Z82 41)    The family history was reviewed and updated today  Social History    · Denied: Alcohol use   · Denied: Drug use (305 90) (F19 90)   · Former smoker (V15 82) (V43 532)   · Quit in 1983 1 PPD   · Single  The social history was reviewed and updated today  Current Meds   1  Acetaminophen 500 MG Oral Tablet; TAKE 1 TABLET EVERY 4 TO 6 HOURS AS   NEEDED; Therapy: (Recorded:89Vuc7955) to Recorded   2  Albuterol Sulfate (2 5 MG/3ML) 0 083% Inhalation Nebulization Solution; USE 1 UNIT   DOSE IN NEBULIZER 4 TIMES DAILY; Therapy: 54Cwi7882 to (Nahum Segovia)  Requested for: 44Hzv5780 Ordered   3  Calcium 600 + D TABS; TAKE 1 TABLET DAILY; Therapy: (Michele Greenwood) to Recorded   4  Digoxin 125 MCG Oral Tablet; take one tablet by mouth every other day; Therapy: 10HLC5015 to 9397 8822)  Requested for: 37Fou1540; Last   Rx:97Ikd7517 Ordered   5  Furosemide 40 MG Oral Tablet; TAKE ONE TABLET BY MOUTH ONCE DAILY; Therapy: 61PWX0883 to 9397 8822)  Requested for: 14Wye1427; Last   Rx:49Ipm8290; Status: 1554 Surgeons  to Pharmacy - Awaiting Verification   Ordered   6  LevETIRAcetam 500 MG Oral Tablet; take one tablet by mouth twice daily; Therapy: 07EXR8810 to (Evaluate:88Uqd1321)  Requested for: 40Rss7298; Last   Rx:39Pfl3783 Ordered   7  Losartan Potassium 50 MG Oral Tablet; TAKE ONE TABLET BY MOUTH ONCE DAILY; Therapy: 74GTL7485 to (Aurora Baird)  Requested for: 59HGF5351; Last   Rx:68Ylx4271 Ordered   8  MetFORMIN HCl - 500 MG Oral Tablet; take one tablet by mouth twice daily;    Therapy: 51AXM4479 to (Evaluate:16Oct2018)  Requested for: 21Oct2017; Last   Rx:21Oct2017 Ordered   9  Metoprolol Tartrate 25 MG Oral Tablet; TAKE ONE-HALF TABLET BY MOUTH TWICE   DAILY; Therapy: 77Ygh7352 to (Evaluate:11Jan2018)  Requested for: 61YMA3891; Last   Rx:16Jan2017 Ordered   10  Os-Vaibhav TABS; Take 1 tablet twice daily; Therapy: (Recorded:19Jun2013) to Recorded   11  Pravastatin Sodium 80 MG Oral Tablet; TAKE ONE TABLET BY MOUTH ONCE DAILY; Therapy: 41PGM7780 to (Evaluate:46Qtx7476)  Requested for: 45ZCW4336; Last    Rx:13Nov2017 Ordered   12  Spiriva Respimat AERS; INHALE 2 PUFFS BY MOUTH DAILY; Therapy: (Recorded:60Zlb6195) to Recorded   13  TraZODone HCl - 50 MG Oral Tablet; Take 1 tablet by mouth at bedtime; Therapy: 17HBH1862 to (05 06 52 16 25)  Requested for: 69SXH8310; Last    Rx:10Oct2012 Ordered   14  Ventolin  (90 Base) MCG/ACT Inhalation Aerosol Solution; 2 puffs 4 times a day    as needed; Therapy: 09VCM6400 to (Evaluate:21Jan2017)  Requested for: 66XZF6730; Last    Rx:78Wbp3322 Ordered   15  Warfarin Sodium 3 MG Oral Tablet; TAKE ONE TABLET BY MOUTH ONCE DAILY; Therapy: 59AAN3245 to (Evaluate:30Ofg0457)  Requested for: 28Nov2016; Last    Rx:28Nov2016 Ordered   16  Warfarin Sodium 5 MG Oral Tablet; TAKE ONE TABLET BY MOUTH ONCE DAILY AS    DIRECTED; Therapy: 24OCM1892 to (Yong Richardson)  Requested for: 75Zif7673; Last    Rx:32Sve7203; Status: ACTIVE - Transmit to Pharmacy - Awaiting Verification Ordered    Allergies    1   Penicillins    Immunizations   1 2 3 4    Influenza  01-Oct-2013  (83y) Oct 2014  (84y) 06-Oct-2015  (85y) 22-Sep-2016  (86y)    PCV  01-Dec-2015  (85y)       PPSV  2000        Vitals  Signs    Temperature: 97 4 F  Heart Rate: 68  Respiration: 16  Systolic: 146, LUE, Sitting  Diastolic: 74, LUE, Sitting  BP Cuff Size: Large  Height: 5 ft 5 in  Weight: 156 lb   BMI Calculated: 25 96  BSA Calculated: 1 78    Physical Exam    Constitutional   General appearance: No acute distress, well appearing and well nourished  Head and Face   Palpation of the face and sinuses: No sinus tenderness  Eyes   Conjunctiva and lids: No swelling, erythema or discharge  Ears, Nose, Mouth, and Throat   Otoscopic examination: Tympanic membranes translucent with normal light reflex  Canals patent without erythema  Oropharynx: Normal with no erythema, edema, exudate or lesions  Neck   Neck: Supple, symmetric, trachea midline, no masses  Thyroid: Abnormal   left thyroid nodule  Pulmonary   Auscultation of lungs: Clear to auscultation  Cardiovascular   Auscultation of heart: Normal rate and rhythm, normal S1 and S2, no murmurs  Carotid pulses: 2+ bilaterally  no bruit heard over the right carotid and no bruit heard over the left carotid  Pedal pulses: 2+ bilaterally  Examination of extremities for edema and/or varicosities: Normal     Lymphatic   Palpation of lymph nodes in neck: No lymphadenopathy  no anterior cervical node enlargement, no posterior cervical node enlargement, no submandibular node enlargement and no supraclavicular node enlargement  Musculoskeletal   Gait and station: Normal     Digits and nails: Normal without clubbing or cyanosis  Skin   Skin and subcutaneous tissue: Normal without rashes or lesions  Psychiatric   Mood and affect: Normal        Results/Data  * CT CHEST WO CONTRAST [id number] 10:13AM Atilio Duron Order Number: [id number]    - [facility] Instructions[facility] 10:30AM     Test Name Result Flag Reference   CT CHEST WO CONTRAST (Report)     CT CHEST WITHOUT IV CONTRAST     INDICATION: Malignant neoplasm of unspecified portion of right bronchus or lung  COMPARISON: CT PE study 8/12/2017     TECHNIQUE: CT examination of the chest was performed without intravenous contrast  Reformatted images were created in axial, sagittal, and coronal planes  Radiation dose length product (DLP) for this visit: 196 mGy-cm    This examination, like all CT scans performed in the [facility], was performed utilizing techniques to minimize radiation dose exposure, including the use of iterative    reconstruction and automated exposure control  FINDINGS:     LUNGS: There are centrilobular emphysematous changes  Stable 2 mm right lower lobe nodule (series 3 image 45)  Stable 7 mm pleural-based right lower lobe nodule (series 3 image 35)  Stable 5 mm pleural-based right upper lobe nodule (series 3 image    25)  There are postsurgical changes in the right upper lobe  PLEURA: Unremarkable  HEART/GREAT VESSELS: Unremarkable for patient's age  MEDIASTINUM AND KRISTIE: No enlarged lymph nodes on this limited noncontrast evaluation  CHEST WALL AND LOWER NECK:    Stable 2 4 cm nodule within the left lobe of the thyroid with coarse calcification  This is stable from prior exams  VISUALIZED STRUCTURES IN THE UPPER ABDOMEN: Unremarkable  OSSEOUS STRUCTURES: No acute fracture  No destructive osseous lesion  IMPRESSION:   1  Stable pulmonary nodules measuring up to 7 mm  These are without significant change from 2014 exam    2  Stable left-sided thyroid nodule unchanged from prior exams  3  Centrilobular emphysematous changes  Workstation performed: [id number]     Signed by:   Woody Plaza MD   12/1/17     (1) LIPID PANEL, FASTING [id number] 07:42AM Chauncey Biswas     Test Name Result Flag Reference   CHOLESTEROL, TOTAL 155 mg/dL  <200   HDL CHOLESTEROL 62 mg/dL  >05   TRIGLICERIDES 569 mg/dL  <150   LDL-CHOLESTEROL 73 mg/dL (calc)     Reference range: <100     Desirable range <100 mg/dL for patients with CHD or  diabetes and <70 mg/dL for diabetic patients with  known heart disease  LDL-C is now calculated using the Homero-Shepherd   calculation, which is a validated novel method providing   better accuracy than the Friedewald equation in the   estimation of LDL-C  Evaristo White  [facility]   2139;843(81): 8536-3448 (http://[employer]/faq/LYO098)   CHOL/HDLC RATIO 2 5 (calc)  <5 0   NON HDL CHOLESTEROL 93 mg/dL (calc)  <130   For patients with diabetes plus 1 major ASCVD risk   factor, treating to a non-HDL-C goal of <100 mg/dL   (LDL-C of <70 mg/dL) is considered a therapeutic   option  (Q) MICROALBUMIN, RANDOM URINE (W/CREATININE) [id number] 07:42AM [facility]     Test Name Result Flag Reference   CREATININE, RANDOM URINE 82 mg/dL     MICROALBUMIN 6 0 mg/dL     Reference Range  Not established   MICROALBUMIN/CREATININE$RATIO, RANDOM URINE 73 mcg/mg creat H <30   The ADA defines abnormalities in albumin  excretion as follows:     Category         Result (mcg/mg creatinine)     Normal                    <30  Microalbuminuria            Clinical albuminuria   > OR = 300     The ADA recommends that at least two of three  specimens collected within a 3-6 month period be  abnormal before considering a patient to be  within a diagnostic category  (1) COMPREHENSIVE METABOLIC PANEL [id number] 88:81QC [facility]     Test Name Result Flag Reference   GLUCOSE 92 mg/dL  65-99   Fasting reference interval   UREA NITROGEN (BUN) 19 mg/dL  7-25   CREATININE 0 85 mg/dL  0 60-0 88   For patients >52years of age, the reference limit  for Creatinine is approximately 13% higher for people  identified as -American  eGFR NON-AFR   AMERICAN 62 mL/min/1 73m2  > OR = 60   eGFR AFRICAN AMERICAN 71 mL/min/1 73m2  > OR = 60   BUN/CREATININE RATIO   0-29   NOT APPLICABLE (calc)   SODIUM 140 mmol/L  135-146   POTASSIUM 4 2 mmol/L  3 5-5 3   CHLORIDE 99 mmol/L     CARBON DIOXIDE 31 mmol/L  20-31   CALCIUM 9 3 mg/dL  8 6-10 4   PROTEIN, TOTAL 6 8 g/dL  6 1-8 1   ALBUMIN 4 4 g/dL  3 6-5 1   GLOBULIN 2 4 g/dL (calc)  1 9-3 7   ALBUMIN/GLOBULIN RATIO 1 8 (calc)  1 0-2 5   BILIRUBIN, TOTAL 0 7 mg/dL  0 2-1 2   ALKALINE PHOSPHATASE 48 U/L     AST 20 U/L  10-35   ALT 14 U/L  6-29     (1) CBC/PLT/DIFF [id number] 07:42AM [facility]     Test Name Result Flag Reference   WHITE BLOOD CELL COUNT 6 7 Thousand/uL  3 8-10 8   RED BLOOD CELL COUNT 4 81 Million/uL  3 80-5 10   HEMOGLOBIN 16 0 g/dL H 11 7-15 5   HEMATOCRIT 47 1 % H 35 0-45 0   MCV 97 9 fL  80 0-100 0   MCH 33 3 pg H 27 0-33 0   MCHC 34 0 g/dL  32 0-36 0   RDW 13 0 %  11 0-15 0   PLATELET COUNT 236 Thousand/uL  140-400   ABSOLUTE NEUTROPHILS 4462 cells/uL  7145-4335   ABSOLUTE LYMPHOCYTES 1588 cells/uL  850-3900   ABSOLUTE MONOCYTES 543 cells/uL  200-950   ABSOLUTE EOSINOPHILS 80 cells/uL     ABSOLUTE BASOPHILS 27 cells/uL  0-200   NEUTROPHILS 66 6 %     LYMPHOCYTES 23 7 %     MONOCYTES 8 1 %     EOSINOPHILS 1 2 %     BASOPHILS 0 4 %     MPV 10 9 fL  7 5-12 5     (Q) TSH, 3RD GENERATION W/REFLEX TO FT4 [id number] 07:42AM [facility]     Test Name Result Flag Reference   TSH W/REFLEX TO FT4 5 82 mIU/L H 0 40-4 50   T4, FREE 1 2 ng/dL  0 8-1 8     (Q) HEMOGLOBIN A1c [id number] 07:42AM [facility]   REPORT COMMENT:  FASTING:YES     Test Name Result Flag Reference   HEMOGLOBIN A1c 5 5 % of total Hgb  <5 7   For the purpose of screening for the presence of  diabetes:     <5 7%       Consistent with the absence of diabetes  5 7-6 4%    Consistent with increased risk for diabetes              (prediabetes)  > or =6 5%  Consistent with diabetes     This assay result is consistent with a decreased risk  of diabetes  Currently, no consensus exists regarding use of  hemoglobin A1c for diagnosis of diabetes in children  According to American Diabetes Association (ADA)  guidelines, hemoglobin A1c <7 0% represents optimal  control in non-pregnant diabetic patients  Different  metrics may apply to specific patient populations  Standards of Medical Care in Diabetes(ADA)       (1) LEVETIRACETAM ( KEPPRA) [id number] 11:28PM [facility], Provider   Test ordered by: Nubia Martinez     Test Name Result Flag Reference   LEVETIRACETAM (KEPPRA) 16 0 ug/mL 10 0 - 40 0   Performed at:  [address]  [id number]  : Goldie Rosales MD, Phone:  [phone]     Tameka Rowell Exam [id number] 12:00AM      Test Name Result Flag Reference   FOOT EXAM [id number]       Summary / No summary entered :      No summary entered  Documents attached :      sPodiatry - Jessica Shcultz; Enc: [id number] - Image Encounter - Jessica Schultz - [facility]) (Result Document)  *(Q) VITAMIN D, 25-HYDROXY, LC/MS/MS [id number] 08:19AM Michaela Bertrand   REPORT COMMENT:  FASTING:YES     Test Name Result Flag Reference   VITAMIN D, 25-OH, TOTAL 40 ng/mL     Vitamin D Status         25-OH Vitamin D:     Deficiency:                    <20 ng/mL  Insufficiency:             20 - 29 ng/mL  Optimal:                 > or = 30 ng/mL     For 25-OH Vitamin D testing on patients on   D2-supplementation and patients for whom quantitation   of D2 and D3 fractions is required, the QuestAssureD(TM)  25-OH VIT D, (D2,D3), LC/MS/MS is recommended: order   code 02989 (patients >2yrs)  For more information on this test, go to:  http://Ushi/faq/QTS795  (This link is being provided for   informational/educational purposes only )     US THYROID [id number] 09:41AM Jolly Covarrubias     Test Name Result Flag Reference   US THYROID (Report)     THYROID ULTRASOUND     INDICATION: 80year-old with history of RIGHT thyroidectomy  Follow-up LEFT nodules     COMPARISON: 2/1/2016 and multiple more remote priors  TECHNIQUE:  Ultrasound of the thyroid was performed with a high frequency linear transducer in transverse and sagittal planes including volumetric imaging sweeps as well as traditional still imaging technique  FINDINGS:   Normal homogeneous smooth echotexture  Right gland: Prior RIGHT thyroidectomy  Thyroidectomy bed within normal limits  Left gland: 5 9 x 2 9 x 3 5 cm  LEFT upper pole measuring 2 5 x 2 1 x 1 8 cm  Solid isoechoic nodule with coarse central calcification  Unchanged from prior    This nodule has been biopsied twice previously in 2011 and 2013  Mid to lower pole measuring 3 4 x 2 5 x 2 6 cm  Solid with central cystic components and coarse calcification  Unchanged from prior compared to recent priors  Decreased in size compared to 2012  Isthmus: 0 5 cm in AP dimension  No dominant nodules  IMPRESSION:      Prior RIGHT thyroidectomy  2 dominant LEFT thyroid nodules without suspicious changes  Workstation performed: [id number]     Signed by:   Ayesha Foote MD   11/30/16     Health Management  History of Mixed simple and mucopurulent chronic bronchitis   Complete PFT; every 1 year; Next Due: 08EJX2830; Overdue  Type 2 diabetes mellitus   *VB - Eye Exam; every 1 year; Last [id number]; Next Due: 17FQB1954; Overdue  Health Maintenance   BREAST EXAM; every 1 year; Next Due: 01Ynw6907; Overdue  Medicare Annual Wellness Visit; every 1 year; Next Due: 47TJE9859; Overdue    Future Appointments    Date/Time Provider Specialty Site   11/27/2018 10:30 AM MARYCARMEN Altman  Thoracic Surgery [facility]   02/20/2018 10:40 AM MARYCARMEN Oh  Endocrinology [facility] ENDOCRINOLOG[facility]   12/15/2017 10:00 AM Yolanda Perry [facility] Pulmonary Medicine [facility]   05/22/2018 10:00 AM MARYCARMEN Ford  Pulmonary Medicine [facility] [place] [place]de   06/06/2018 11:00 AM MARYCARMEN Beckford   Family Medicine [id number]     Signatures   Electronically signed by : MARYCARMEN Vargas ; Dec  5 2017  7:51AM EST                       (Author)

## 2018-01-31 DIAGNOSIS — I48.20 CHRONIC ATRIAL FIBRILLATION (HCC): Primary | ICD-10-CM

## 2018-01-31 RX ORDER — DIGOXIN 125 MCG
TABLET ORAL
Qty: 15 TABLET | Refills: 5 | Status: SHIPPED | OUTPATIENT
Start: 2018-01-31

## 2018-02-02 DIAGNOSIS — M81.0 AGE-RELATED OSTEOPOROSIS WITHOUT CURRENT PATHOLOGICAL FRACTURE: ICD-10-CM

## 2018-02-02 DIAGNOSIS — E04.2 NONTOXIC MULTINODULAR GOITER: ICD-10-CM

## 2018-02-05 ENCOUNTER — HOSPITAL ENCOUNTER (OUTPATIENT)
Dept: RADIOLOGY | Facility: MEDICAL CENTER | Age: 83
Discharge: HOME | End: 2018-02-05
Payer: COMMERCIAL

## 2018-02-05 DIAGNOSIS — E04.2 NONTOXIC MULTINODULAR GOITER: ICD-10-CM

## 2018-02-05 DIAGNOSIS — M81.0 AGE-RELATED OSTEOPOROSIS WITHOUT CURRENT PATHOLOGICAL FRACTURE: ICD-10-CM

## 2018-02-05 PROCEDURE — 77080 DXA BONE DENSITY AXIAL: CPT

## 2018-02-05 PROCEDURE — 76536 US EXAM OF HEAD AND NECK: CPT

## 2018-02-10 DIAGNOSIS — E78.5 DYSLIPIDEMIA, GOAL LDL BELOW 70: Primary | ICD-10-CM

## 2018-02-10 DIAGNOSIS — G40.909 SEIZURE DISORDER (HCC): Primary | ICD-10-CM

## 2018-02-12 RX ORDER — LEVETIRACETAM 500 MG/1
TABLET ORAL
Qty: 60 TABLET | Refills: 5 | Status: SHIPPED | OUTPATIENT
Start: 2018-02-12

## 2018-02-12 RX ORDER — PRAVASTATIN SODIUM 80 MG/1
TABLET ORAL
Qty: 90 TABLET | Refills: 0 | Status: SHIPPED | OUTPATIENT
Start: 2018-02-12

## 2018-02-16 ENCOUNTER — ANTICOAG VISIT (OUTPATIENT)
Dept: FAMILY MEDICINE CLINIC | Facility: CLINIC | Age: 83
End: 2018-02-16

## 2018-02-16 DIAGNOSIS — I63.9 CEREBROVASCULAR ACCIDENT (CVA), UNSPECIFIED MECHANISM (HCC): Primary | ICD-10-CM

## 2018-02-16 LAB
INR PPP: 2.8
PROTHROMBIN TIME: 29 SEC (ref 9–11.5)

## 2018-02-17 DIAGNOSIS — I10 ESSENTIAL HYPERTENSION: Primary | ICD-10-CM

## 2018-02-17 RX ORDER — LOSARTAN POTASSIUM 50 MG/1
TABLET ORAL
Qty: 90 TABLET | Refills: 3 | Status: SHIPPED | OUTPATIENT
Start: 2018-02-17

## 2018-02-20 ENCOUNTER — TELEPHONE (OUTPATIENT)
Dept: ENDOCRINOLOGY | Facility: CLINIC | Age: 83
End: 2018-02-20

## 2018-02-20 ENCOUNTER — OFFICE VISIT (OUTPATIENT)
Dept: ENDOCRINOLOGY | Facility: CLINIC | Age: 83
End: 2018-02-20
Payer: COMMERCIAL

## 2018-02-20 VITALS
HEIGHT: 64 IN | WEIGHT: 155.3 LBS | SYSTOLIC BLOOD PRESSURE: 136 MMHG | DIASTOLIC BLOOD PRESSURE: 84 MMHG | BODY MASS INDEX: 26.51 KG/M2 | HEART RATE: 64 BPM

## 2018-02-20 DIAGNOSIS — E04.2 MULTINODULAR GOITER: ICD-10-CM

## 2018-02-20 DIAGNOSIS — R79.89 ELEVATED TSH: ICD-10-CM

## 2018-02-20 DIAGNOSIS — M81.0 MENOPAUSAL OSTEOPOROSIS: Primary | ICD-10-CM

## 2018-02-20 PROCEDURE — 99214 OFFICE O/P EST MOD 30 MIN: CPT | Performed by: INTERNAL MEDICINE

## 2018-02-20 NOTE — ASSESSMENT & PLAN NOTE
History of prior hip fracture, current DEXA scan reviewed with patient and daughter  FRAX score meets criteria for pharmacological therapy  Suggest starting Prolia q 6 months and they are agreeable    Will request   If approved patient would like to get prolia at primary care's office as their office is closer to home

## 2018-02-20 NOTE — LETTER
February 20, 2018     Kalia Ramsay MD  [address] [place] [id number]    Patient: Maureen Medel   YOB: 1930   Date of Visit: 2/20/2018       Dear Dr Villar Salts:    Thank you for referring Eusebia Hernandez to me for evaluation  Below are my notes for this consultation  If you have questions, please do not hesitate to call me  I look forward to following your patient along with you  Sincerely,        Kristy Christensen MD        CC: No Recipients  Kristy Christensen MD  2/20/2018 12:57 PM  Sign at close encounter   Maureen Medel 80 y o  female MRN: [id number]    Encounter: [id number]      Assessment/Plan     Problem List Items Addressed This Visit     Menopausal osteoporosis - Primary     History of prior hip fracture, current DEXA scan reviewed with patient and daughter  FRAX score meets criteria for pharmacological therapy  Suggest starting Prolia q 6 months and they are agreeable  Will request   If approved patient would like to get prolia at primary care's office as their office is closer to home         Relevant Orders    T4, free Lab Collect    TSH, 3rd generation Lab Collect    Vitamin D 25 hydroxy Lab Collect    Elevated TSH     TSH mildly elevated, no indication for thyroid hormone replacement-will continue to monitor         Relevant Orders    T4, free Lab Collect    TSH, 3rd generation Lab Collect    Vitamin D 25 hydroxy Lab Collect    Multinodular goiter     Thyroid nodules stable-will continue to monitor             CC:   Thyroid nodules and osteoporosis follow-up    History of Present Illness     HPI:  55-year-old woman with history of thyroid nodules, osteoporosis and elevated TSH here for follow-up  She is currently on  Ca + d twice  A day + MVI   Uses cane , no falls   2009- hip fracture after a fall at NH     History of thyroid nodules and has had fine-needle aspiration biopsy in the past, on steroid imaging thyroid nodules have remained stable in size    Denies any obstructive symptoms including difficulty swallowing breathing or pressure sensation  No family history of thyroid cancer  No history of radiation to her neck      Review of Systems   Constitutional: Positive for fatigue  Negative for fever and unexpected weight change  Respiratory: Positive for shortness of breath  Negative for cough  Cardiovascular: Negative for chest pain, palpitations and leg swelling  Gastrointestinal: Negative for constipation, diarrhea, nausea and vomiting  Endocrine: Negative for polyuria  Musculoskeletal: Positive for arthralgias and gait problem  Skin: Negative for color change, pallor, rash and wound  Psychiatric/Behavioral: Negative for sleep disturbance  All other systems reviewed and are negative  Historical Information   Past Medical History:   Diagnosis Date    [facility]fib [facility])     Cardiac disease     CHF (congestive heart failure) (HCC)     COPD (chronic obstructive pulmonary disease) (HCC)     Diabetes mellitus (HCC)     Disease of thyroid gland     Hyperlipidemia     Hypertension     [facility]     Stroke [facility] )      Past Surgical History:   Procedure Laterality Date    APPENDECTOMY      CHOLECYSTECTOMY      LUNG REMOVAL, PARTIAL Right 2009    RU Lobe    THYROIDECTOMY       Social History   History   Alcohol Use No     History   Drug Use No     History   Smoking Status    Former Smoker   Smokeless Tobacco    Never Used     Family History:   Family History   Problem Relation Age of Onset    Diabetes Sister      Family history of coronary disease    Diabetes Mother        Meds/Allergies   Current Outpatient Prescriptions   Medication Sig Dispense Refill    acetaminophen (TYLENOL) 500 MG chewable tablet Chew 500 mg 2 (two) times a day        albuterol (PROVENTIL HFA,VENTOLIN HFA) 90 mcg/act inhaler Inhale 2 puffs every 6 (six) hours as needed for wheezing      Calcium Carb-Cholecalciferol (CALCIUM 600+D3 PO) Take 1 tablet by mouth 2 (two) times a day   digoxin (LANOXIN) 0 125 mg tablet TAKE ONE TABLET BY MOUTH EVERY OTHER DAY 15 tablet 5    furosemide (LASIX) 40 mg tablet Take 40 mg by mouth daily   guaiFENesin (MUCINEX) 600 mg 12 hr tablet Take 1,200 mg by mouth every 12 (twelve) hours      levETIRAcetam (KEPPRA) 500 mg tablet TAKE ONE TABLET BY MOUTH TWICE DAILY 60 tablet 5    losartan (COZAAR) 50 mg tablet TAKE ONE TABLET BY MOUTH ONCE DAILY 90 tablet 3    metFORMIN (GLUCOPHAGE) 500 mg tablet Take 500 mg by mouth daily with breakfast       metoprolol tartrate (LOPRESSOR) 25 mg tablet Take 12 5 mg by mouth 2 (two) times a day   pravastatin (PRAVACHOL) 80 mg tablet TAKE ONE TABLET BY MOUTH ONCE DAILY 90 tablet 0    tiotropium (SPIRIVA) 18 mcg inhalation capsule Place 18 mcg into inhaler and inhale daily      traZODone (DESYREL) 50 mg tablet Take 50 mg by mouth daily at bedtime   warfarin (COUMADIN) 3 mg tablet Take 3 mg by mouth daily   fenofibrate (TRICOR) 145 mg tablet Take 145 mg by mouth daily  No current facility-administered medications for this visit  Allergies   Allergen Reactions    Penicillins Rash       Objective   Vitals: Blood pressure 136/84, pulse 64, height 5' 4" (1 626 m), weight 70 4 kg (155 lb 4 8 oz)  Physical Exam   Constitutional: She is oriented to person, place, and time  She appears well-developed and well-nourished  No distress  HENT:   Head: Normocephalic and atraumatic  Mouth/Throat: No oropharyngeal exudate  Eyes: Conjunctivae and EOM are normal    Neck: Normal range of motion  Neck supple  Prior surgical scar , no masses    Cardiovascular: Normal rate, regular rhythm and normal heart sounds  No murmur heard  Pulmonary/Chest: Effort normal and breath sounds normal  No respiratory distress  She has no wheezes  She has no rales  Abdominal: Soft  Bowel sounds are normal  She exhibits no distension  There is no tenderness  There is no rebound     Musculoskeletal: Normal range of motion  She exhibits edema  She exhibits no deformity  Lymphadenopathy:     She has no cervical adenopathy  Neurological: She is alert and oriented to person, place, and time  Skin: Skin is warm and dry  No rash noted  No erythema  No pallor  Psychiatric: She has a normal mood and affect  Her behavior is normal  Judgment and thought content normal        The history was obtained from the review of the chart, patient and family      Lab Results:   Lab Results   Component Value Date/Time    Sodium 140 11/28/2017 07:42 AM    Sodium 141 08/14/2017 04:41 AM    Sodium 142 08/13/2017 06:19 AM    Sodium 138 08/12/2017 05:20 PM    Potassium 4 2 11/28/2017 07:42 AM    Potassium 4 2 08/14/2017 04:41 AM    Potassium 4 1 08/13/2017 06:19 AM    Potassium 4 4 08/12/2017 05:20 PM    Chloride 99 11/28/2017 07:42 AM    Chloride 103 08/14/2017 04:41 AM    Chloride 102 08/13/2017 06:19 AM    Chloride 100 08/12/2017 05:20 PM    CO2 31 11/28/2017 07:42 AM    CO2 36 (H) 08/14/2017 04:41 AM    CO2 30 08/13/2017 06:19 AM    CO2 33 (H) 08/12/2017 05:20 PM    BUN 19 11/28/2017 07:42 AM    BUN 23 08/14/2017 04:41 AM    BUN 13 08/13/2017 06:19 AM    BUN 15 08/12/2017 05:20 PM    Creatinine 0 85 11/28/2017 07:42 AM    Creatinine 0 76 08/14/2017 04:41 AM    Creatinine 0 81 08/13/2017 06:19 AM    Creatinine 0 85 08/12/2017 05:20 PM    Glucose 108 08/14/2017 04:41 AM    Glucose 184 (H) 08/13/2017 06:19 AM    Glucose 99 08/12/2017 05:20 PM    Calcium 9 3 11/28/2017 07:42 AM    Calcium 8 4 08/14/2017 04:41 AM    Calcium 9 0 08/13/2017 06:19 AM    Calcium 9 5 08/12/2017 05:20 PM    Anion Gap 2 (L) 08/14/2017 04:41 AM    Anion Gap 10 08/13/2017 06:19 AM    Anion Gap 5 08/12/2017 05:20 PM    eGFR 71 08/14/2017 04:41 AM    eGFR 66 08/13/2017 06:19 AM    eGFR 62 08/12/2017 05:20 PM    TSH 3RD GENERATON 5 82 (H) 11/28/2017 07:42 AM    TSH 3RD GENERATON 3 050 08/12/2017 05:20 PM    Free T4 1 2 11/28/2017 07:42 AM         Imaging Studies:            Results for orders placed during the hospital encounter of 02/05/18   DXA bone density spine hip and pelvis    Impression 1  Low bone mass (osteopenia), based on the left femoral neck  2   The 10 year risk of hip fracture is 3 8% with the 10 year risk of major osteoporotic fracture being 16% as calculated by the [facility]/WHO fracture risk assessment tool (FRAX)  3   The current NOF guidelines recommend treating patients with a T-score of -2 5 or less in the lumbar spine or hips, or in post-menopausal women and men over the age of 48 with low bone mass (osteopenia) and a FRAX 10 year risk score of >3% for hip   fracture and/or >20% for major osteoporotic fracture  4   A daily intake of at least 1200 mg calcium and vitamin D 800- 1000 IU, as well as weight bearing and muscle strengthening exercise, fall prevention and avoidance of tobacco and excessive alcohol as preventive measurements are suggested  5   Follow-up DXA in two years is recommended for most patients  A one year follow-up DXA is recommended after initiation or change in therapy for osteoporosis  More frequent evaluation is also appropriate for patients with conditions associated with   rapid bone loss, such as glucocorticoid therapy  The FRAX tool has not been validated in patients currently or previously treated with pharmacotherapy for osteoporosis  In such patients, clinical judgment must be exercised in interpreting FRAX scores  It is not appropriate to use FRAX to monitor   treatment response  WHO CLASSIFICATION:  Normal (a T-score of -1 0 or higher)  Low bone mineral density (a T-score of less than -1 0 but higher than -2 5)  Osteoporosis (a T-score of -2 5 or less)  Severe osteoporosis (a T-score of -2 5 or less with a fragility fracture)            Workstation performed: [id number]               Study Result     THYROID ULTRASOUND     INDICATION: Nontoxic multinodular goiter    Right thyroidectomy      COMPARISON: Ultrasound 11/29/2016     TECHNIQUE:   Ultrasound of the thyroid was performed with a high frequency linear transducer in transverse and sagittal planes including volumetric imaging sweeps as well as traditional still imaging technique      FINDINGS:  Heterogeneous echotexture left lobe      Right lobe:  Absent  Left lobe:  6 4 x 2 7 x 2 5 cm  Isthmus:  0 2 cm      2 4 x 2 2 x 1 7 cm upper lobe nodule previously measured 2 5 x 2 1 x 1 8 cm  3 2 x 2 7 x 2 6 cm lower pole nodule previously measured 3 4 x 2 5 x 2 6 cm     IMPRESSION:     2 dominant left thyroid nodules without suspicious interval change      Status post right thyroidectomy             I have personally reviewed pertinent reports  Portions of the record may have been created with voice recognition software  Occasional wrong word or "sound a like" substitutions may have occurred due to the inherent limitations of voice recognition software  Read the chart carefully and recognize, using context, where substitutions have occurred

## 2018-02-20 NOTE — LETTER
February 20, 2018     Heather Eller MD  [address] [place] [id number]    Patient: Jodee Ashley   YOB: 1930   Date of Visit: 2/20/2018       Dear Dr Remi Blair:    Thank you for referring Kehinde Cheng to me for evaluation  Below are my notes for this consultation  If you have questions, please do not hesitate to call me  I look forward to following your patient along with you  Sincerely,        Yemi Naidu MD        CC: No Recipients  Yemi Naidu MD  2/20/2018 11:18 AM  Sign at close encounter   Jodee Ashley 80 y o  female MRN: [id number]    Encounter: [id number]      Assessment/Plan     Problem List Items Addressed This Visit     Menopausal osteoporosis - Primary    Relevant Orders    T4, free Lab Collect    TSH, 3rd generation Lab Collect    Vitamin D 25 hydroxy Lab Collect    Elevated TSH    Relevant Orders    T4, free Lab Collect    TSH, 3rd generation Lab Collect    Vitamin D 25 hydroxy Lab Collect    Multinodular goiter        CC:   ***    History of Present Illness     HPI:  Ca + d twice  A day + MVI   Uses cane , no falls   2009- hip fracture after a fall in NH       Review of Systems   Constitutional: Negative for fever and unexpected weight change  Skin: Negative for color change, pallor, rash and wound  Psychiatric/Behavioral: Negative for sleep disturbance         Historical Information   Past Medical History:   Diagnosis Date    A-fib [facility])     Cardiac disease     CHF (congestive heart failure) (HCC)     COPD (chronic obstructive pulmonary disease) (HCC)     [facility] [address] [facility]     Disease of thyroid gland     Hyperlipidemia     Hypertension     [facility]     Stroke [facility])      Past Surgical History:   Procedure Laterality Date    APPENDECTOMY      CHOLECYSTECTOMY      LUNG REMOVAL, PARTIAL Right 2009    RU Lobe    THYROIDECTOMY       Social History   History   Alcohol Use No     History   Drug Use No     History   Smoking Status    Former Smoker   Smokeless Tobacco    Never Used     Family History:   Family History   Problem Relation Age of Onset    Diabetes Sister      Family history of coronary disease    Diabetes Mother        Meds/Allergies   Current Outpatient Prescriptions   Medication Sig Dispense Refill    acetaminophen (TYLENOL) 500 MG chewable tablet Chew 500 mg 2 (two) times a day   albuterol (PROVENTIL HFA,VENTOLIN HFA) 90 mcg/act inhaler Inhale 2 puffs every 6 (six) hours as needed for wheezing      Calcium Carb-Cholecalciferol (CALCIUM 600+D3 PO) Take 1 tablet by mouth 2 (two) times a day   digoxin (LANOXIN) 0 125 mg tablet TAKE ONE TABLET BY MOUTH EVERY OTHER DAY 15 tablet 5    furosemide (LASIX) 40 mg tablet Take 40 mg by mouth daily   guaiFENesin (MUCINEX) 600 mg 12 hr tablet Take 1,200 mg by mouth every 12 (twelve) hours      levETIRAcetam (KEPPRA) 500 mg tablet TAKE ONE TABLET BY MOUTH TWICE DAILY 60 tablet 5    losartan (COZAAR) 50 mg tablet TAKE ONE TABLET BY MOUTH ONCE DAILY 90 tablet 3    metFORMIN (GLUCOPHAGE) 500 mg tablet Take 500 mg by mouth daily with breakfast       metoprolol tartrate (LOPRESSOR) 25 mg tablet Take 12 5 mg by mouth 2 (two) times a day   pravastatin (PRAVACHOL) 80 mg tablet TAKE ONE TABLET BY MOUTH ONCE DAILY 90 tablet 0    tiotropium (SPIRIVA) 18 mcg inhalation capsule Place 18 mcg into inhaler and inhale daily      traZODone (DESYREL) 50 mg tablet Take 50 mg by mouth daily at bedtime   warfarin (COUMADIN) 3 mg tablet Take 3 mg by mouth daily   fenofibrate (TRICOR) 145 mg tablet Take 145 mg by mouth daily  No current facility-administered medications for this visit  Allergies   Allergen Reactions    Penicillins Rash       Objective   Vitals: Blood pressure 136/84, pulse 64, height 5' 4" (1 626 m), weight 70 4 kg (155 lb 4 8 oz)      Physical Exam    The history was obtained from the review of the chart, {HISTORY OBTAINED FROM:[id number]}  Lab Results:   Lab Results   Component Value Date/Time    Sodium 140 11/28/2017 07:42 AM    Sodium 141 08/14/2017 04:41 AM    Sodium 142 08/13/2017 06:19 AM    Sodium 138 08/12/2017 05:20 PM    Potassium 4 2 11/28/2017 07:42 AM    Potassium 4 2 08/14/2017 04:41 AM    Potassium 4 1 08/13/2017 06:19 AM    Potassium 4 4 08/12/2017 05:20 PM    Chloride 99 11/28/2017 07:42 AM    Chloride 103 08/14/2017 04:41 AM    Chloride 102 08/13/2017 06:19 AM    Chloride 100 08/12/2017 05:20 PM    CO2 31 11/28/2017 07:42 AM    CO2 36 (H) 08/14/2017 04:41 AM    CO2 30 08/13/2017 06:19 AM    CO2 33 (H) 08/12/2017 05:20 PM    BUN 19 11/28/2017 07:42 AM    BUN 23 08/14/2017 04:41 AM    BUN 13 08/13/2017 06:19 AM    BUN 15 08/12/2017 05:20 PM    Creatinine 0 85 11/28/2017 07:42 AM    Creatinine 0 76 08/14/2017 04:41 AM    Creatinine 0 81 08/13/2017 06:19 AM    Creatinine 0 85 08/12/2017 05:20 PM    Glucose 108 08/14/2017 04:41 AM    Glucose 184 (H) 08/13/2017 06:19 AM    Glucose 99 08/12/2017 05:20 PM    Calcium 9 3 11/28/2017 07:42 AM    Calcium 8 4 08/14/2017 04:41 AM    Calcium 9 0 08/13/2017 06:19 AM    Calcium 9 5 08/12/2017 05:20 PM    Anion Gap 2 (L) 08/14/2017 04:41 AM    Anion Gap 10 08/13/2017 06:19 AM    Anion Gap 5 08/12/2017 05:20 PM    eGFR 71 08/14/2017 04:41 AM    eGFR 66 08/13/2017 06:19 AM    eGFR 62 08/12/2017 05:20 PM    TSH 3RD GENERATON 5 82 (H) 11/28/2017 07:42 AM    TSH 3RD GENERATON 3 050 08/12/2017 05:20 PM    Free T4 1 2 11/28/2017 07:42 AM         Imaging Studies:            Results for orders placed during the hospital encounter of 02/05/18   DXA bone density spine hip and pelvis    Impression 1  Low bone mass (osteopenia), based on the left femoral neck  2   The 10 year risk of hip fracture is 3 8% with the 10 year risk of major osteoporotic fracture being 16% as calculated by the [facility]/WHO fracture risk assessment tool (FRAX)        3   The current NOF guidelines recommend treating patients with a T-score of -2 5 or less in the lumbar spine or hips, or in post-menopausal women and men over the age of 48 with low bone mass (osteopenia) and a FRAX 10 year risk score of >3% for hip   fracture and/or >20% for major osteoporotic fracture  4   A daily intake of at least 1200 mg calcium and vitamin D 800- 1000 IU, as well as weight bearing and muscle strengthening exercise, fall prevention and avoidance of tobacco and excessive alcohol as preventive measurements are suggested  5   Follow-up DXA in two years is recommended for most patients  A one year follow-up DXA is recommended after initiation or change in therapy for osteoporosis  More frequent evaluation is also appropriate for patients with conditions associated with   rapid bone loss, such as glucocorticoid therapy  The FRAX tool has not been validated in patients currently or previously treated with pharmacotherapy for osteoporosis  In such patients, clinical judgment must be exercised in interpreting FRAX scores  It is not appropriate to use FRAX to monitor   treatment response  WHO CLASSIFICATION:  Normal (a T-score of -1 0 or higher)  Low bone mineral density (a T-score of less than -1 0 but higher than -2 5)  Osteoporosis (a T-score of -2 5 or less)  Severe osteoporosis (a T-score of -2 5 or less with a fragility fracture)            Workstation performed: [id number]               Study Result     THYROID ULTRASOUND     INDICATION: Nontoxic multinodular goiter  Right thyroidectomy      COMPARISON: Ultrasound 11/29/2016     TECHNIQUE:   Ultrasound of the thyroid was performed with a high frequency linear transducer in transverse and sagittal planes including volumetric imaging sweeps as well as traditional still imaging technique      FINDINGS:  Heterogeneous echotexture left lobe      Right lobe:  Absent  Left lobe:  6 4 x 2 7 x 2 5 cm    Isthmus:  0 2 cm      2 4 x 2 2 x 1 7 cm upper lobe nodule previously measured 2 5 x 2 1 x 1 8 cm  3 2 x 2 7 x 2 6 cm lower pole nodule previously measured 3 4 x 2 5 x 2 6 cm     IMPRESSION:     2 dominant left thyroid nodules without suspicious interval change      Status post right thyroidectomy             I have personally reviewed pertinent reports  Portions of the record may have been created with voice recognition software  Occasional wrong word or "sound a like" substitutions may have occurred due to the inherent limitations of voice recognition software  Read the chart carefully and recognize, using context, where substitutions have occurred

## 2018-02-20 NOTE — PATIENT INSTRUCTIONS
Bone Densitometry   WHAT YOU NEED TO KNOW:   Bone densitometry is a scan (test) that measures bone density  A loss of density may increase your risk for osteoporosis  Bone densitometry is also called a dual energy x-ray absorptiometry (DXA) scan  DXA scans use x-rays to show if your bones have lost minerals, such as calcium, causing them to become weak  DXA scans are usually done on your hip, spine, or forearm  A DXA scan can also be done of your entire body  DISCHARGE INSTRUCTIONS:   Follow up with your healthcare provider as directed:  Write down your questions so you remember to ask them during your visits  Protect your bones:   · Eat foods that are high in calcium and vitamin D  Examples are milk, cheese, yogurt, salmon, tofu, almonds, and beans  Limit caffeine  Ask your healthcare provider if you need a calcium or vitamin D supplement  · Exercise for at least 30 minutes, 3 times a week  Exercise can help increase your bone density and decrease your risk for fractures  Ask your healthcare provider about the best exercise plan for you  · Prevent falls  Keep your home well lighted so you can see things clearly  Remove throw rugs, or secure them to the floor  Install grab bars near your bathtub and toilet  Ask your healthcare provider for more information about preventing falls in your home  · Limit alcohol  Women should limit alcohol to 1 drink a day  Men should limit alcohol to 2 drinks a day  A drink of alcohol is 12 ounces of beer, 5 ounces of wine, or 1½ ounces of liquor  · Do not smoke  If you smoke, it is never too late to quit  Smoking can decrease your bone density, and increase your risk for a broken bone  Ask your healthcare provider for information if you need help quitting  Contact your healthcare provider if:   · You have questions or concerns about your condition or care      Return to the emergency department if:   · You fall and think you may have broken a bone     · Your condition or symptoms suddenly get worse  © 2017 [address] [employer] Information is for End User's use only and may not be sold, redistributed or otherwise used for commercial purposes  All illustrations and images included in CareNotes® are the copyrighted property of [employer]  or [employer]  The above information is an  only  It is not intended as medical advice for individual conditions or treatments  Talk to your doctor, nurse or pharmacist before following any medical regimen to see if it is safe and effective for you

## 2018-02-20 NOTE — PROGRESS NOTES
Yani Crane 80 y o  female MRN: [id number]    Encounter: [id number]      Assessment/Plan     Problem List Items Addressed This Visit     Menopausal osteoporosis - Primary     History of prior hip fracture, current DEXA scan reviewed with patient and daughter  FRAX score meets criteria for pharmacological therapy  Suggest starting Prolia q 6 months and they are agreeable  Will request   If approved patient would like to get prolia at primary care's office as their office is closer to home         Relevant Orders    T4, free Lab Collect    TSH, 3rd generation Lab Collect    Vitamin D 25 hydroxy Lab Collect    Elevated TSH     TSH mildly elevated, no indication for thyroid hormone replacement-will continue to monitor         Relevant Orders    T4, free Lab Collect    TSH, 3rd generation Lab Collect    Vitamin D 25 hydroxy Lab Collect    Multinodular goiter     Thyroid nodules stable-will continue to monitor             CC:   Thyroid nodules and osteoporosis follow-up    History of Present Illness     HPI:  66-year-old woman with history of thyroid nodules, osteoporosis and elevated TSH here for follow-up  She is currently on  Ca + d twice  A day + MVI   Uses cane , no falls   2009- hip fracture after a fall at NH     History of thyroid nodules and has had fine-needle aspiration biopsy in the past, on steroid imaging thyroid nodules have remained stable in size  Denies any obstructive symptoms including difficulty swallowing breathing or pressure sensation  No family history of thyroid cancer  No history of radiation to her neck      Review of Systems   Constitutional: Positive for fatigue  Negative for fever and unexpected weight change  Respiratory: Positive for shortness of breath  Negative for cough  Cardiovascular: Negative for chest pain, palpitations and leg swelling  Gastrointestinal: Negative for constipation, diarrhea, nausea and vomiting  Endocrine: Negative for polyuria  Musculoskeletal: Positive for arthralgias and gait problem  Skin: Negative for color change, pallor, rash and wound  Psychiatric/Behavioral: Negative for sleep disturbance  All other systems reviewed and are negative  Historical Information   Past Medical History:   Diagnosis Date    A-fib [facility])     Cardiac disease     CHF (congestive heart failure) (HCC)     COPD (chronic obstructive pulmonary disease) (HCC)     Diabetes mellitus (HCC)     Disease of thyroid gland     Hyperlipidemia     Hypertension     Lung cancer ([facility] [address] )     Stroke ([facility] [address] )      Past Surgical History:   Procedure Laterality Date    APPENDECTOMY      CHOLECYSTECTOMY      LUNG REMOVAL, PARTIAL Right 2009    RU Lobe    THYROIDECTOMY       Social History   History   Alcohol Use No     History   Drug Use No     History   Smoking Status    Former Smoker   Smokeless Tobacco    Never Used     Family History:   Family History   Problem Relation Age of Onset    Diabetes Sister      Family history of coronary disease    Diabetes Mother        Meds/Allergies   Current Outpatient Prescriptions   Medication Sig Dispense Refill    acetaminophen (TYLENOL) 500 MG chewable tablet Chew 500 mg 2 (two) times a day   albuterol (PROVENTIL HFA,VENTOLIN HFA) 90 mcg/act inhaler Inhale 2 puffs every 6 (six) hours as needed for wheezing      Calcium Carb-Cholecalciferol (CALCIUM 600+D3 PO) Take 1 tablet by mouth 2 (two) times a day   digoxin (LANOXIN) 0 125 mg tablet TAKE ONE TABLET BY MOUTH EVERY OTHER DAY 15 tablet 5    furosemide (LASIX) 40 mg tablet Take 40 mg by mouth daily        guaiFENesin (MUCINEX) 600 mg 12 hr tablet Take 1,200 mg by mouth every 12 (twelve) hours      levETIRAcetam (KEPPRA) 500 mg tablet TAKE ONE TABLET BY MOUTH TWICE DAILY 60 tablet 5    losartan (COZAAR) 50 mg tablet TAKE ONE TABLET BY MOUTH ONCE DAILY 90 tablet 3    metFORMIN (GLUCOPHAGE) 500 mg tablet Take 500 mg by mouth daily with breakfast      [facility] metoprolol tartrate (LOPRESSOR) 25 mg tablet Take 12 5 mg by mouth 2 (two) times a day   pravastatin (PRAVACHOL) 80 mg tablet TAKE ONE TABLET BY MOUTH ONCE DAILY 90 tablet 0    tiotropium (SPIRIVA) 18 mcg inhalation capsule Place 18 mcg into inhaler and inhale daily      traZODone (DESYREL) 50 mg tablet Take 50 mg by mouth daily at bedtime   warfarin (COUMADIN) 3 mg tablet Take 3 mg by mouth daily   fenofibrate (TRICOR) 145 mg tablet Take 145 mg by mouth daily  No current facility-administered medications for this visit  Allergies   Allergen Reactions    Penicillins Rash       Objective   Vitals: Blood pressure 136/84, pulse 64, height 5' 4" (1 626 m), weight 70 4 kg (155 lb 4 8 oz)  Physical Exam   Constitutional: She is oriented to person, place, and time  She appears well-developed and well-nourished  No distress  HENT:   Head: Normocephalic and atraumatic  Mouth/Throat: No oropharyngeal exudate  Eyes: Conjunctivae and EOM are normal    Neck: Normal range of motion  Neck supple  Prior surgical scar , no masses    Cardiovascular: Normal rate, regular rhythm and normal heart sounds  No murmur heard  Pulmonary/Chest: Effort normal and breath sounds normal  No respiratory distress  She has no wheezes  She has no rales  Abdominal: Soft  Bowel sounds are normal  She exhibits no distension  There is no tenderness  There is no rebound  Musculoskeletal: Normal range of motion  She exhibits edema  She exhibits no deformity  Lymphadenopathy:     She has no cervical adenopathy  Neurological: She is alert and oriented to person, place, and time  Skin: Skin is warm and dry  No rash noted  No erythema  No pallor  Psychiatric: She has a normal mood and affect  Her behavior is normal  Judgment and thought content normal        The history was obtained from the review of the chart, patient and family      Lab Results:   Lab Results   Component Value Date/Time    Sodium 140 11/28/2017 07:42 AM    Sodium 141 08/14/2017 04:41 AM    Sodium 142 08/13/2017 06:19 AM    Sodium 138 08/12/2017 05:20 PM    Potassium 4 2 11/28/2017 07:42 AM    Potassium 4 2 08/14/2017 04:41 AM    Potassium 4 1 08/13/2017 06:19 AM    Potassium 4 4 08/12/2017 05:20 PM    Chloride 99 11/28/2017 07:42 AM    Chloride 103 08/14/2017 04:41 AM    Chloride 102 08/13/2017 06:19 AM    Chloride 100 08/12/2017 05:20 PM    CO2 31 11/28/2017 07:42 AM    CO2 36 (H) 08/14/2017 04:41 AM    CO2 30 08/13/2017 06:19 AM    CO2 33 (H) 08/12/2017 05:20 PM    BUN 19 11/28/2017 07:42 AM    BUN 23 08/14/2017 04:41 AM    BUN 13 08/13/2017 06:19 AM    BUN 15 08/12/2017 05:20 PM    Creatinine 0 85 11/28/2017 07:42 AM    Creatinine 0 76 08/14/2017 04:41 AM    Creatinine 0 81 08/13/2017 06:19 AM    Creatinine 0 85 08/12/2017 05:20 PM    Glucose 108 08/14/2017 04:41 AM    Glucose 184 (H) 08/13/2017 06:19 AM    Glucose 99 08/12/2017 05:20 PM    Calcium 9 3 11/28/2017 07:42 AM    Calcium 8 4 08/14/2017 04:41 AM    Calcium 9 0 08/13/2017 06:19 AM    Calcium 9 5 08/12/2017 05:20 PM    Anion Gap 2 (L) 08/14/2017 04:41 AM    Anion Gap 10 08/13/2017 06:19 AM    Anion Gap 5 08/12/2017 05:20 PM    eGFR 71 08/14/2017 04:41 AM    eGFR 66 08/13/2017 06:19 AM    eGFR 62 08/12/2017 05:20 PM    TSH 3RD GENERATON 5 82 (H) 11/28/2017 07:42 AM    TSH 3RD GENERATON 3 050 08/12/2017 05:20 PM    Free T4 1 2 11/28/2017 07:42 AM         Imaging Studies:            Results for orders placed during the hospital encounter of 02/05/18   DXA bone density spine hip and pelvis    Impression 1  Low bone mass (osteopenia), based on the left femoral neck  2   The 10 year risk of hip fracture is 3 8% with the 10 year risk of major osteoporotic fracture being 16% as calculated by the [facility]/WHO fracture risk assessment tool (FRAX)        3   The current NOF guidelines recommend treating patients with a T-score of -2 5 or less in the lumbar spine or hips, or in post-menopausal women and men over the age of 48 with low bone mass (osteopenia) and a FRAX 10 year risk score of >3% for hip   fracture and/or >20% for major osteoporotic fracture  4   A daily intake of at least 1200 mg calcium and vitamin D 800- 1000 IU, as well as weight bearing and muscle strengthening exercise, fall prevention and avoidance of tobacco and excessive alcohol as preventive measurements are suggested  5   Follow-up DXA in two years is recommended for most patients  A one year follow-up DXA is recommended after initiation or change in therapy for osteoporosis  More frequent evaluation is also appropriate for patients with conditions associated with   rapid bone loss, such as glucocorticoid therapy  The FRAX tool has not been validated in patients currently or previously treated with pharmacotherapy for osteoporosis  In such patients, clinical judgment must be exercised in interpreting FRAX scores  It is not appropriate to use FRAX to monitor   treatment response  WHO CLASSIFICATION:  Normal (a T-score of -1 0 or higher)  Low bone mineral density (a T-score of less than -1 0 but higher than -2 5)  Osteoporosis (a T-score of -2 5 or less)  Severe osteoporosis (a T-score of -2 5 or less with a fragility fracture)            Workstation performed: [id number]               Study Result     THYROID ULTRASOUND     INDICATION: Nontoxic multinodular goiter  Right thyroidectomy      COMPARISON: Ultrasound 11/29/2016     TECHNIQUE:   Ultrasound of the thyroid was performed with a high frequency linear transducer in transverse and sagittal planes including volumetric imaging sweeps as well as traditional still imaging technique      FINDINGS:  Heterogeneous echotexture left lobe      Right lobe:  Absent  Left lobe:  6 4 x 2 7 x 2 5 cm  Isthmus:  0 2 cm      2 4 x 2 2 x 1 7 cm upper lobe nodule previously measured 2 5 x 2 1 x 1 8 cm    3 2 x 2 7 x 2 6 cm lower pole nodule previously measured 3 4 x 2 5 x 2 6 cm     IMPRESSION:     2 dominant left thyroid nodules without suspicious interval change      Status post right thyroidectomy             I have personally reviewed pertinent reports  Portions of the record may have been created with voice recognition software  Occasional wrong word or "sound a like" substitutions may have occurred due to the inherent limitations of voice recognition software  Read the chart carefully and recognize, using context, where substitutions have occurred

## 2018-03-12 ENCOUNTER — OFFICE VISIT (OUTPATIENT)
Dept: FAMILY MEDICINE CLINIC | Facility: CLINIC | Age: 83
End: 2018-03-12
Payer: COMMERCIAL

## 2018-03-12 VITALS
SYSTOLIC BLOOD PRESSURE: 132 MMHG | TEMPERATURE: 98.4 F | RESPIRATION RATE: 18 BRPM | HEIGHT: 64 IN | WEIGHT: 155.2 LBS | HEART RATE: 74 BPM | DIASTOLIC BLOOD PRESSURE: 82 MMHG | BODY MASS INDEX: 26.5 KG/M2

## 2018-03-12 DIAGNOSIS — M17.12 PRIMARY OSTEOARTHRITIS OF LEFT KNEE: ICD-10-CM

## 2018-03-12 DIAGNOSIS — M16.12 PRIMARY OSTEOARTHRITIS OF LEFT HIP: Primary | ICD-10-CM

## 2018-03-12 PROBLEM — J96.11 CHRONIC HYPOXEMIC RESPIRATORY FAILURE (HCC): Status: ACTIVE | Noted: 2017-08-22

## 2018-03-12 PROBLEM — E78.2 MIXED HYPERLIPIDEMIA: Status: ACTIVE | Noted: 2017-08-12

## 2018-03-12 PROBLEM — R79.89 ELEVATED TSH: Status: RESOLVED | Noted: 2018-02-20 | Resolved: 2018-03-12

## 2018-03-12 PROBLEM — R79.1 SUPRATHERAPEUTIC INR: Status: RESOLVED | Noted: 2017-08-12 | Resolved: 2018-03-12

## 2018-03-12 PROCEDURE — 99213 OFFICE O/P EST LOW 20 MIN: CPT | Performed by: FAMILY MEDICINE

## 2018-03-12 NOTE — PROGRESS NOTES
Assessment/Plan     Diagnoses and all orders for this visit:    Primary osteoarthritis of left hip  -     XR hip/pelv 2-3 vws left if performed; Future    Primary osteoarthritis of left knee  -     XR knee 3 vw left non injury; Future        Suspected OA left hip and left knee  Element of left hip trochanteric bursitis  Extra Strength Tylenol 500 two tablets mg three times a day  Consider x rays left hip and left knee for persistent symptoms  Advised to call if any changes  Check pro time this week  Patient ID: Rona Ordonez is a 80 y o  female  Patient presents with a several week history of recurrent left leg pain  she points to left lateral hip and knee  pain with weight-bearing? No trauma or injury  No recent falls  She is ambulating with a cane  Daughter reports she has had pain getting in and out of the car  No nighttime symptoms  Current medications reviewed  On Warfarin for Af  The following portions of the patient's history were reviewed and updated as appropriate: current medications, past family history, past medical history, past social history, past surgical history and problem list     Review of Systems   Constitutional: Negative for appetite change, chills, fever and unexpected weight change  Respiratory: Negative for cough, shortness of breath and wheezing  Cardiovascular: Negative for chest pain, palpitations and leg swelling  Gastrointestinal: Positive for blood in stool  Negative for abdominal pain, constipation, diarrhea, nausea and vomiting  Scant bleeding with wiping    Genitourinary: Negative for difficulty urinating  Musculoskeletal: Negative for joint swelling  See HPI   Neurological: Negative for dizziness and headaches  Hematological: Bruises/bleeds easily (on Warfarin )           Objective:      /82 (BP Location: Left arm, Patient Position: Sitting, Cuff Size: Large)   Pulse 74   Temp 98 4 °F (36 9 °C)   Resp 18   Ht 5' 3 9" (1 623 m)   Wt 70 4 kg (155 lb 3 2 oz)   BMI 26 72 kg/m²          Physical Exam   Cardiovascular:   No calf tenderness or Homans sign  Musculoskeletal: She exhibits no edema or deformity  Inspection left knee normal   No swelling  No warmth or redness  No joint line tenderness  Crepitus on range of motion  No laxity of collateral ligaments  Negative anterior drawer sign  Decreased range of motion of left hip with internal rotation  no reproducible pain  mild tenderness over left trochanteric bursa area  No lumbar spine or SI tenderness  Strength in lower extremities normal   Nursing note and vitals reviewed

## 2018-03-15 ENCOUNTER — TELEPHONE (OUTPATIENT)
Dept: FAMILY MEDICINE CLINIC | Facility: CLINIC | Age: 83
End: 2018-03-15

## 2018-03-15 LAB
INR PPP: 3.1
PROTHROMBIN TIME: 31.9 SEC (ref 9–11.5)

## 2018-03-17 DIAGNOSIS — I48.91 ATRIAL FIBRILLATION, UNSPECIFIED TYPE (HCC): Primary | ICD-10-CM

## 2018-03-17 RX ORDER — WARFARIN SODIUM 5 MG/1
TABLET ORAL
Qty: 30 TABLET | Refills: 5 | Status: SHIPPED | OUTPATIENT
Start: 2018-03-17

## 2018-03-19 ENCOUNTER — ANTICOAG VISIT (OUTPATIENT)
Dept: FAMILY MEDICINE CLINIC | Facility: CLINIC | Age: 83
End: 2018-03-19

## 2018-03-29 ENCOUNTER — OFFICE VISIT (OUTPATIENT)
Dept: FAMILY MEDICINE CLINIC | Facility: CLINIC | Age: 83
End: 2018-03-29
Payer: COMMERCIAL

## 2018-03-29 VITALS
WEIGHT: 157.6 LBS | SYSTOLIC BLOOD PRESSURE: 140 MMHG | BODY MASS INDEX: 27.14 KG/M2 | HEART RATE: 68 BPM | TEMPERATURE: 98.8 F | DIASTOLIC BLOOD PRESSURE: 76 MMHG | RESPIRATION RATE: 18 BRPM

## 2018-03-29 DIAGNOSIS — N39.41 URGE INCONTINENCE OF URINE: ICD-10-CM

## 2018-03-29 DIAGNOSIS — N90.7 LABIAL CYST: Primary | ICD-10-CM

## 2018-03-29 PROCEDURE — 99214 OFFICE O/P EST MOD 30 MIN: CPT | Performed by: FAMILY MEDICINE

## 2018-03-29 RX ORDER — CLINDAMYCIN HYDROCHLORIDE 300 MG/1
300 CAPSULE ORAL 3 TIMES DAILY
Qty: 21 CAPSULE | Refills: 0 | Status: SHIPPED | OUTPATIENT
Start: 2018-03-29 | End: 2018-04-05

## 2018-03-29 NOTE — ASSESSMENT & PLAN NOTE
Likely contributing to her current abscess  If symptoms persist, would consider medication in the future

## 2018-03-29 NOTE — PATIENT INSTRUCTIONS
Abscess   AMBULATORY CARE:   An abscess  is an area under the skin where pus (infected fluid) collects  An abscess is often caused by bacteria, fungi or other germs that get into an open wound  You can get an abscess anywhere on your body  Common signs and symptoms of an abscess: You may have a swollen mass that is red and painful  Pus may leak out of the mass  The pus will be white or yellow and may smell bad  You may have redness and pain days before the mass appears  You may have a fever and chills if the infection spreads  Seek immediate care if:   · The area around your abscess becomes very painful, warm, or has red streaks  · You have a fever and chills  · Your heart is beating faster than usual      · You feel faint or confused  Contact your healthcare provider if:   · Your abscess gets bigger or does not get better  · Your abscess returns  · You have questions or concerns about your condition or care  Treatment for an abscess: Your healthcare provider may need to make a cut in the abscess to allow the pus to drain  You may need surgery to remove your abscess  You may  need any of the following:  · Antibiotics  help treat a bacterial infection  · Acetaminophen  decreases pain and fever  It is available without a doctor's order  Ask how much to take and how often to take it  Follow directions  Acetaminophen can cause liver damage if not taken correctly  · NSAIDs , such as ibuprofen, help decrease swelling, pain, and fever  This medicine is available with or without a doctor's order  NSAIDs can cause stomach bleeding or kidney problems in certain people  If you take blood thinner medicine, always ask your healthcare provider if NSAIDs are safe for you  Always read the medicine label and follow directions  · Take your medicine as directed  Contact your healthcare provider if you think your medicine is not helping or if you have side effects   Tell him or her if you are allergic to any medicine  Keep a list of the medicines, vitamins, and herbs you take  Include the amounts, and when and why you take them  Bring the list or the pill bottles to follow-up visits  Carry your medicine list with you in case of an emergency  Self-care:   · Apply a warm compress to your abscess  This will help it open and drain  Wet a washcloth in warm, but not hot, water  Apply the compress for 10 minutes  Repeat this 4 times each day  Do not  press on an abscess or try to open it with a needle  You may push the bacteria deeper or into your blood  · Do not share your clothes, towels, or sheets with anyone  This can spread the infection to others  · Wash your hands often  This can help prevent the spread of germs  Use soap and water or an alcohol-based hand rub  Care for your wound after it is drained:   · Care for your wound as directed  If your healthcare provider says it is okay, carefully remove the bandage and gauze packing  You may need to soak the gauze to get it out of your wound  Clean your wound and the area around it as directed  Dry the area and put on new, clean bandages  Change your bandages when they get wet or dirty  · Ask your healthcare provider how to change the gauze in your wound  Keep track of how many pieces of gauze are placed inside the wound  Do not put too much packing in the wound  Do not pack the gauze too tightly in your wound  Follow up with your healthcare provider in 1 to 3 days: You may need to have your packing removed or your bandage changed  Write down your questions so you remember to ask them during your visits  © 2017 [address] [employer] Information is for End User's use only and may not be sold, redistributed or otherwise used for commercial purposes  All illustrations and images included in CareNotes® are the copyrighted property of [employer]  or [employer]  The above information is an  only   It is not intended as medical advice for individual conditions or treatments  Talk to your doctor, nurse or pharmacist before following any medical regimen to see if it is safe and effective for you

## 2018-03-29 NOTE — ASSESSMENT & PLAN NOTE
Patient has had 1 weeks worth of left labial pain and swelling  No evidence of uterine or bladder prolapse on exam   There is a small abscess on the left labia as well as significant swelling  Recommended warm compresses and   Attempt to express the pus  Given her age and location of the abscess will also add clindamycin 300 mg t i d  For 1 week  This is also likely due to underlying urinary incontinence which was reproducible on exam with cough  Recommended keeping the area dry and avoiding using   Wet pads or diapers  Review signs or symptoms for which to go to the ER and signs or symptoms for which to return to the clinic

## 2018-03-29 NOTE — PROGRESS NOTES
FAMILY MEDICINE PROGRESS NOTE  Dano Dong 80 y o  female   MRN: [id number]      ASSESSMENT and PLAN:  Dano Dong is a 80 y o  female with:     Urge incontinence of urine    Likely contributing to her current abscess  If symptoms persist, would consider medication in the future  Labial cyst   Patient has had 1 weeks worth of left labial pain and swelling  No evidence of uterine or bladder prolapse on exam   There is a small abscess on the left labia as well as significant swelling  Recommended warm compresses and   Attempt to express the pus  Given her age and location of the abscess will also add clindamycin 300 mg t i d  For 1 week  This is also likely due to underlying urinary incontinence which was reproducible on exam with cough  Recommended keeping the area dry and avoiding using   Wet pads or diapers  Review signs or symptoms for which to go to the ER and signs or symptoms for which to return to the clinic  SUBJECTIVE:  Dano Dong is a 80 y o  female who presents today with a chief complaint of Groin Burn (lump on genitalia for 3 days)  Approximately 1 week ago pt had stretched and bent down to grab something and then she noticed something protruding from her vagina  She denies sharp pain, bleeding  Does have urinary incontinence   vaginal deliveries   Denies gyn surgeries        Review of Systems   Constitutional: Negative for chills and fever  Genitourinary: Positive for vaginal pain  Negative for decreased urine volume, difficulty urinating, dysuria, enuresis, menstrual problem, urgency, vaginal bleeding and vaginal discharge  Skin: Positive for wound  I have reviewed the patient's PMH, Social History, Medication List and Allergies        OBJECTIVE:  /76 (BP Location: Left arm, Patient Position: Sitting, Cuff Size: Large)   Pulse 68   Temp 98 8 °F (37 1 °C) (Tympanic)   Resp 18   Wt 71 5 kg (157 lb 9 6 oz)   BMI 27 14 kg/m²   Physical Exam Constitutional: She appears well-developed and well-nourished  No distress  Genitourinary:       No labial fusion  There is no rash, tenderness, lesion or injury on the right labia  There is tenderness and lesion on the left labia  There is no rash or injury on the left labia  There is erythema and tenderness in the vagina  No bleeding in the vagina  No vaginal discharge found  Genitourinary Comments:   No evidence of prolapse with cough or at rest   Skin: She is not diaphoretic

## 2018-03-30 ENCOUNTER — ANTICOAG VISIT (OUTPATIENT)
Dept: FAMILY MEDICINE CLINIC | Facility: CLINIC | Age: 83
End: 2018-03-30

## 2018-03-30 LAB
INR PPP: 2.3
PROTHROMBIN TIME: 23.7 SEC (ref 9–11.5)

## 2018-04-09 NOTE — PROGRESS NOTES
Cardiology Outpatient Follow up Note    Janie Alejandre 80 y o  female MRN: [id number]    04/10/18          Assessment/Plan:    1  Persistent atrial fibrillation with history of prior CVA  She is on Coumadin as directed by Dr Brandin Martines  She is on Digoxin 125 qod and Metoprolol 12 5 bid for rate control  HR is controlled to bradycardic, which is chronic and unchanged, she denies any dizziness or lightheadedness or syncope      2  HTN  She is on Furosemide and Metoprolol, Losartan (previously was on Lisinopril, was switched to Losartan due to coughing)  BP at goal of less than 150/90      3  DM  Hgb A1c 4/17 was 6 1%  Hgb A1c 6 3% 8/17  She is on Metformin  Hgb A1c 5 5% 11/17      4  HL  Lipid panel 7/15 showed total cholesterol 136, HDL 40, , LDL 74  She is on Pravastatin 80 mg  Gemfibrozil was switched to Tricor in hospital in 11/15  Lipid panel 4/17 showed total cholesterol 123, HDL 43, TG 88, LDL 62  I told her she can stop the Tricor in 4/17  Lipid panel 6/17 showed total cholesterol 136, HDL 59, LDL 61, TG 78, in 8/13/17 showed total cholesterol 145, TG 54, HDL 67, LDL 67  Lipid panel 11/17 showed total cholesterol 155, HDL 62, LDL 73,        4  Chronic diastolic HF with severe pulm HTN  She is on Lasix, previously on 20 mg, now on 40 mg, and Digoxin 125 every other day  Increase Lasix from 40 to 80 mg daily for three days, then back to 40 mg daily, as she does have some LE edema on exam today  BMP 0 85 11/17      5  Lung nodule  Mildly increased in size by CT scan in 8/17  Being monitored by thoracic surgery  History of SCC s/p lobe resection  1  Type 2 diabetes mellitus without complication, without long-term current use of insulin ([address] )     2  Obstructive sleep apnea on CPAP     3  Pulmonary hypertension (HCC)  POCT ECG   4  Chronic atrial fibrillation (HCC)  POCT ECG   5  Chronic diastolic congestive heart failure ([address] )     6  Essential hypertension  POCT ECG   7   Mixed hyperlipidemia HPI: 80year old woman who is here for follow up of HTN, HL, DM, RUBY using CPAP at night, prior CVA (2009 requiring craniotomy), persistent atrial fibrillation, and diastolic HF, diagnosed in 2010, for which she is maintained on Lasix, who is here for follow up of chronic diastolic HF  Echo in 10/11 showed mild concentric LVH with biatrial enlargement, severe MR, and impaired diastolic relaxation  , PASP was 33 mm Hg, TR was mild  She was admitted to [address] [place] [address] 11/12-11/16/15 for acute hypoxic respiratory failure and accelerated HTN (SBP up to 210) causing acute/chronic diastolic HF  She was diuresed and discharged home on 2L NC  Her antihypertensive regimen was adjusted  Echo 11/15 showed normal LV size and function, EF 65%, no RMWA, wall thickness mildly to moderately increased, RV mildly dilated with normal function, LA mildly dilated, RA markedly dilated  Mild MR, moderate TR  PASP 62 mm Hg, consistent with severe pulmonary HTN  She was admitted to [place] [address] in 8/17 for shortness of breath  cough, weakness  Daughter reports her oxygen levels at home were low off oxygen, she did have a cold at that time  CTA chest was negative for PE, as well as pulmonary edema or pleural effusions  She was seen by cardiology in consultation, who did not feel she was clinically in HF  She was also seen by Pulmonary, who thought her symptoms might be related to viral illness and some difficulty expectorating        Echo 8/17 showed normal LV size and function, no RWMA, mild LVH, grade II diastolic dysfunction, EF 43-63%  RV size and function were normal  LA and RA were dilated  Moderate MR with mild MAC  Mildly calcified aortic valve, no stenosis or AI  Moderate TR with PASP 60 mm Hg, consistent with severe pulmonary HTN  She reports she had a fall last week, she was hanging something up and lost her balance, she hit her back on a chair  She has a bruise on her right side   She did not lose consciousness  Her son in law was at home and was able to help her back up  That was the only recent fall  She reports she is using oxygen only occasionally with ambulation  Denies any shortness of breath or chest pain  She uses her CPAP at night, and also uses supplemental oxygen of 3 L at night  She denies any orthopnea or PND, uses 1 pillow to sleep  Mild LE edema, left greater than right  She is not doing any formal exercise  She does walk around her house, no significant exertional CP or SOB  She walks with a cane if needed when she goes out to store  No dizziness, lightheadedness, or syncope/presyncope  She weighs herself at home, her weight has been stable in low 150s  She is still trying to drink supplemental protein shakes daily to maintain her weight  No problems with bruising or bleeding from Coumadin         Patient Active Problem List   Diagnosis    Chronic [facility] fibrillation [facility] )    Essential hypertension    Type 2 diabetes mellitus without complication, without long-term current use of insulin (HCC)    Constipation    COPD (chronic obstructive pulmonary disease) (HCC)    Hypoxia    CPAP (continuous positive airway pressure) dependence    Lung nodule    Hypothyroid    Mixed hyperlipidemia    Menopausal osteoporosis    Non-toxic multinodular goiter    Chronic diastolic congestive heart failure (HCC)    Chronic hypoxemic respiratory failure (HCC)    Depression    Fatty liver    Insomnia    Mitral regurgitation    Nontoxic single thyroid nodule    Obstructive sleep apnea on CPAP    Osteoporosis    Paroxysmal atrial fibrillation (HCC)    Pulmonary hypertension (HCC)    Seizure disorder (HCC)    Squamous cell carcinoma of right lung (HCC)    Labial cyst    Urge incontinence of urine       Allergies   Allergen Reactions    Penicillins Rash         Current Outpatient Prescriptions:     acetaminophen (TYLENOL) 500 MG chewable tablet, Chew 500 mg 2 (two) times a day , Disp: , Rfl:     albuterol (PROVENTIL HFA,VENTOLIN HFA) 90 mcg/act inhaler, Inhale 2 puffs every 6 (six) hours as needed for wheezing, Disp: , Rfl:     Calcium Carb-Cholecalciferol (CALCIUM 600+D3 PO), Take 1 tablet by mouth 2 (two) times a day , Disp: , Rfl:     digoxin (LANOXIN) 0 125 mg tablet, TAKE ONE TABLET BY MOUTH EVERY OTHER DAY, Disp: 15 tablet, Rfl: 5    fenofibrate (TRICOR) 145 mg tablet, Take 145 mg by mouth daily  , Disp: , Rfl:     furosemide (LASIX) 40 mg tablet, Take 40 mg by mouth daily  , Disp: , Rfl:     guaiFENesin (MUCINEX) 600 mg 12 hr tablet, Take 1,200 mg by mouth as needed  , Disp: , Rfl:     levETIRAcetam (KEPPRA) 500 mg tablet, TAKE ONE TABLET BY MOUTH TWICE DAILY, Disp: 60 tablet, Rfl: 5    losartan (COZAAR) 50 mg tablet, TAKE ONE TABLET BY MOUTH ONCE DAILY, Disp: 90 tablet, Rfl: 3    metFORMIN (GLUCOPHAGE) 500 mg tablet, Take 500 mg by mouth daily with breakfast , Disp: , Rfl:     metoprolol tartrate (LOPRESSOR) 25 mg tablet, Take 12 5 mg by mouth 2 (two) times a day , Disp: , Rfl:     pravastatin (PRAVACHOL) 80 mg tablet, TAKE ONE TABLET BY MOUTH ONCE DAILY, Disp: 90 tablet, Rfl: 0    tiotropium (SPIRIVA) 18 mcg inhalation capsule, Place 18 mcg into inhaler and inhale daily, Disp: , Rfl:     traZODone (DESYREL) 50 mg tablet, Take 50 mg by mouth daily at bedtime  , Disp: , Rfl:     warfarin (COUMADIN) 3 mg tablet, Take 3 mg by mouth daily  , Disp: , Rfl:     warfarin (COUMADIN) 5 mg tablet, TAKE ONE TABLET BY MOUTH ONCE DAILY AS DIRECTED, Disp: 30 tablet, Rfl: 5    Past Medical History:   Diagnosis Date    A-fib [facility])     Cardiac disease     CHF (congestive heart failure) (HCC)     CHF, acute on chronic ([facility] ) 9/8/2016    COPD (chronic obstructive pulmonary disease) ([facility] )     Diabetes mellitus ([facility] )     Disease of thyroid gland     Hyperlipidemia     Hypertension     Lung cancer ([facility] )     Stroke ([address] )        Family History   Problem Relation Age of Onset    Diabetes Sister Family history of coronary disease    Diabetes Mother        Past Surgical History:   Procedure Laterality Date    APPENDECTOMY      CHOLECYSTECTOMY      LUNG REMOVAL, PARTIAL Right 2009    RU Lobe    THYROIDECTOMY         Social History     Social History    Marital status:      Spouse name: N/A    Number of children: N/A    Years of education: N/A     Occupational History    retired      Social History Main Topics    Smoking status: Former Smoker    Smokeless tobacco: Never Used      Comment: "Many years ago"    Alcohol use No    Drug use: No    Sexual activity: Not Currently     Other Topics Concern    Not on file     Social History Narrative    Lives with family       Review of Systems   Constitution: Negative for chills, decreased appetite, diaphoresis, fever, weakness, malaise/fatigue, night sweats, weight gain and weight loss  HENT: Negative for ear pain, hearing loss, hoarse voice, nosebleeds, sore throat and tinnitus  Eyes: Negative for blurred vision and pain  Cardiovascular: Positive for leg swelling  Negative for chest pain, claudication, cyanosis, dyspnea on exertion, irregular heartbeat, near-syncope, orthopnea, palpitations, paroxysmal nocturnal dyspnea and syncope  Respiratory: Positive for cough and sputum production  Negative for hemoptysis, shortness of breath, sleep disturbances due to breathing, snoring and wheezing  Hematologic/Lymphatic: Negative for adenopathy and bleeding problem  Does not bruise/bleed easily  Skin: Negative for color change, dry skin, flushing, itching, poor wound healing and rash  Musculoskeletal: Positive for back pain  Negative for arthritis, falls, joint pain, muscle cramps, muscle weakness, myalgias and neck pain  Gastrointestinal: Positive for diarrhea  Negative for abdominal pain, constipation, dysphagia, heartburn, hematemesis, hematochezia, melena, nausea and vomiting     Genitourinary: Negative for dysuria, frequency, hematuria, hesitancy, non-menstrual bleeding and urgency  Neurological: Negative for excessive daytime sleepiness, dizziness, focal weakness, headaches, light-headedness, loss of balance, numbness, paresthesias, tremors and vertigo  Psychiatric/Behavioral: Negative for altered mental status, depression and memory loss  The patient does not have insomnia and is not nervous/anxious  Allergic/Immunologic: Negative for environmental allergies and persistent infections  Vitals: /76 (BP Location: Left arm, Patient Position: Sitting, Cuff Size: Adult)   Pulse 63   Ht 5' 4" (1 626 m)   Wt 71 3 kg (157 lb 1 6 oz)   SpO2 94%   BMI 26 97 kg/m²       Physical Exam:     GEN: Awake and alert, in no acute distress  Well appearing and well nourished  HEENT: Sclera anicteric, conjunctivae pink, mucous membranes moist  Oropharynx clear  NECK: Supple, no carotid bruits, no significant JVD  Trachea midline, no thyromegaly  HEART: Irregular rhythm, HR controlled, no murmurs, clicks, gallops or rubs  PMI nondisplaced, no thrills  LUNGS: Clear to auscultation bilaterally; no wheezes, rales, or rhonchi  No increased work of breathing or signs of respiratory distress  ABDOMEN: Soft, nontender, nondistended, normoactive bowel sounds  EXTREMITIES: Skin warm and well perfused, no clubbing, cyanosis, trace left greater than right 1+ edema  Has area of ecchymosis over sacrum  NEURO: No focal findings  Walking with cane  Normal speech  Mood and affect normal    SKIN: Normal without suspicious lesions on exposed skin        Lab Results:       Lab Results   Component Value Date    HGBA1C 5 5 11/28/2017    HGBA1C 6 3 08/13/2017    HGBA1C 6 1 (H) 04/10/2017     Lab Results   Component Value Date    CHOL 155 11/28/2017    CHOL 145 08/13/2017    CHOL 136 06/06/2017     Lab Results   Component Value Date    HDL 62 11/28/2017    HDL 67 (H) 08/13/2017    HDL 59 06/06/2017     Lab Results   Component Value Date LDLCALC 67 08/13/2017     Lab Results   Component Value Date    TRIG 115 11/28/2017    TRIG 54 08/13/2017    TRIG 78 06/06/2017     No components found for: CHOLHDL

## 2018-04-10 ENCOUNTER — OFFICE VISIT (OUTPATIENT)
Dept: CARDIOLOGY CLINIC | Facility: MEDICAL CENTER | Age: 83
End: 2018-04-10
Payer: COMMERCIAL

## 2018-04-10 VITALS
OXYGEN SATURATION: 94 % | DIASTOLIC BLOOD PRESSURE: 76 MMHG | WEIGHT: 157.1 LBS | HEIGHT: 64 IN | SYSTOLIC BLOOD PRESSURE: 126 MMHG | BODY MASS INDEX: 26.82 KG/M2 | HEART RATE: 63 BPM

## 2018-04-10 DIAGNOSIS — Z99.89 OBSTRUCTIVE SLEEP APNEA ON CPAP: ICD-10-CM

## 2018-04-10 DIAGNOSIS — I27.20 PULMONARY HYPERTENSION (HCC): ICD-10-CM

## 2018-04-10 DIAGNOSIS — G47.33 OBSTRUCTIVE SLEEP APNEA ON CPAP: ICD-10-CM

## 2018-04-10 DIAGNOSIS — I10 ESSENTIAL HYPERTENSION: ICD-10-CM

## 2018-04-10 DIAGNOSIS — E11.9 TYPE 2 DIABETES MELLITUS WITHOUT COMPLICATION, WITHOUT LONG-TERM CURRENT USE OF INSULIN (HCC): Primary | ICD-10-CM

## 2018-04-10 DIAGNOSIS — I48.20 CHRONIC ATRIAL FIBRILLATION (HCC): ICD-10-CM

## 2018-04-10 DIAGNOSIS — E78.2 MIXED HYPERLIPIDEMIA: ICD-10-CM

## 2018-04-10 DIAGNOSIS — I50.32 CHRONIC DIASTOLIC CONGESTIVE HEART FAILURE (HCC): ICD-10-CM

## 2018-04-10 PROCEDURE — 93000 ELECTROCARDIOGRAM COMPLETE: CPT | Performed by: INTERNAL MEDICINE

## 2018-04-10 PROCEDURE — 99214 OFFICE O/P EST MOD 30 MIN: CPT | Performed by: INTERNAL MEDICINE

## 2018-04-18 ENCOUNTER — TELEPHONE (OUTPATIENT)
Dept: FAMILY MEDICINE CLINIC | Facility: CLINIC | Age: 83
End: 2018-04-18

## 2018-04-18 NOTE — TELEPHONE ENCOUNTER
PATIENT'S DAUGHTER CALLED TO SAY HER MOM HAS LOST 8 LBS SINCE 3/30/18 AND SHE IS A LITTLE CONCERNED  SHE FEELS HER FALSE TEETH DO NOT FIT PROPERLY AND MAYBE SHE IS NOT CHEWING ENOUGH TO GET PROPER NUTRITIONAL BENEFIT  SHE IS SCHEDULED FOR FUP WITH YOU ON 6/6/18   SHOULD SHE BE SEEN SOONER?

## 2018-04-27 ENCOUNTER — ANTICOAG VISIT (OUTPATIENT)
Dept: FAMILY MEDICINE CLINIC | Facility: CLINIC | Age: 83
End: 2018-04-27

## 2018-04-27 ENCOUNTER — TELEPHONE (OUTPATIENT)
Dept: FAMILY MEDICINE CLINIC | Facility: CLINIC | Age: 83
End: 2018-04-27

## 2018-04-27 LAB
INR PPP: 2.8
PROTHROMBIN TIME: 28.9 SEC (ref 9–11.5)

## 2018-04-27 NOTE — TELEPHONE ENCOUNTER
----- Message from Venu Tamayo DO sent at 4/27/2018 11:37 AM EDT -----  Please call the patient regarding her abnormal result    If no t already addressed- same dose repeat in 4 weeks

## 2018-05-07 DIAGNOSIS — R94.6 ABNORMAL RESULTS OF THYROID FUNCTION STUDIES: ICD-10-CM

## 2018-05-07 DIAGNOSIS — I48.0 PAROXYSMAL ATRIAL FIBRILLATION (HCC): ICD-10-CM

## 2018-05-07 DIAGNOSIS — E78.2 MIXED HYPERLIPIDEMIA: ICD-10-CM

## 2018-05-07 DIAGNOSIS — E04.1 NONTOXIC SINGLE THYROID NODULE: ICD-10-CM

## 2018-05-07 DIAGNOSIS — E11.9 TYPE 2 DIABETES MELLITUS WITHOUT COMPLICATIONS (HCC): ICD-10-CM

## 2018-05-07 DIAGNOSIS — I10 ESSENTIAL (PRIMARY) HYPERTENSION: ICD-10-CM

## 2018-05-07 DIAGNOSIS — M81.0 AGE-RELATED OSTEOPOROSIS WITHOUT CURRENT PATHOLOGICAL FRACTURE: ICD-10-CM

## 2018-05-08 ENCOUNTER — OFFICE VISIT (OUTPATIENT)
Dept: FAMILY MEDICINE CLINIC | Facility: CLINIC | Age: 83
End: 2018-05-08
Payer: COMMERCIAL

## 2018-05-08 VITALS
BODY MASS INDEX: 26.26 KG/M2 | WEIGHT: 153.8 LBS | HEIGHT: 64 IN | DIASTOLIC BLOOD PRESSURE: 80 MMHG | TEMPERATURE: 98.7 F | SYSTOLIC BLOOD PRESSURE: 118 MMHG | HEART RATE: 60 BPM

## 2018-05-08 DIAGNOSIS — C34.91 SQUAMOUS CELL CARCINOMA OF RIGHT LUNG (HCC): ICD-10-CM

## 2018-05-08 DIAGNOSIS — E78.2 MIXED HYPERLIPIDEMIA: ICD-10-CM

## 2018-05-08 DIAGNOSIS — I27.20 PULMONARY HYPERTENSION (HCC): ICD-10-CM

## 2018-05-08 DIAGNOSIS — I34.0 NON-RHEUMATIC MITRAL REGURGITATION: ICD-10-CM

## 2018-05-08 DIAGNOSIS — E11.9 TYPE 2 DIABETES MELLITUS WITHOUT COMPLICATION, WITHOUT LONG-TERM CURRENT USE OF INSULIN (HCC): ICD-10-CM

## 2018-05-08 DIAGNOSIS — I50.32 CHRONIC DIASTOLIC CONGESTIVE HEART FAILURE (HCC): ICD-10-CM

## 2018-05-08 DIAGNOSIS — I10 ESSENTIAL HYPERTENSION: ICD-10-CM

## 2018-05-08 DIAGNOSIS — J44.9 CHRONIC OBSTRUCTIVE PULMONARY DISEASE, UNSPECIFIED COPD TYPE (HCC): ICD-10-CM

## 2018-05-08 DIAGNOSIS — G47.33 OBSTRUCTIVE SLEEP APNEA ON CPAP: ICD-10-CM

## 2018-05-08 DIAGNOSIS — G40.909 SEIZURE DISORDER (HCC): ICD-10-CM

## 2018-05-08 DIAGNOSIS — Z99.89 OBSTRUCTIVE SLEEP APNEA ON CPAP: ICD-10-CM
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
## 2018-05-08 DIAGNOSIS — R60.9 PERIPHERAL EDEMA: Primary | ICD-10-CM

## 2018-05-08 DIAGNOSIS — I48.20 CHRONIC ATRIAL FIBRILLATION (HCC): ICD-10-CM

## 2018-05-08 PROCEDURE — 99214 OFFICE O/P EST MOD 30 MIN: CPT | Performed by: FAMILY MEDICINE

## 2018-05-08 RX ORDER — FUROSEMIDE 20 MG/1
TABLET ORAL
Qty: 60 TABLET | Refills: 5 | Status: SHIPPED | OUTPATIENT
Start: 2018-05-08

## 2018-05-08 NOTE — PROGRESS NOTES
Assessment/Plan:     Diagnoses and all orders for this visit:    Fall, initial encounter    Type 2 diabetes mellitus without complication, without long-term current use of insulin (HCC)  -     HEMOGLOBIN A1C W/ EAG ESTIMATION    Essential hypertension  -     CBC and differential  -     Comprehensive metabolic panel    Mixed hyperlipidemia  -     Lipid panel  -     TSH, 3rd generation with T4 reflex    Chronic atrial fibrillation (HCC)    Chronic obstructive pulmonary disease, unspecified COPD type (HCC)    Obstructive sleep apnea on CPAP    Pulmonary hypertension (HCC)    Chronic diastolic congestive heart failure (HCC)    Non-rheumatic mitral regurgitation    Squamous cell carcinoma of right lung (HCC)    Seizure disorder ([address] )         continue with current medications  Repeat labs with next pro time  Office visit in 6 months  Patient ID: Geraldina Frankel is a 80 y o  female  follow up visit  medications reviewed  Here with her daughter  No recent  labs 11/2017 see note  Type 2 diabetes mellitus  On Metformin 500 mg daily  A1c 5 5  Urine microalbumin 6 0  on ARB  no hypoglycemic events  No neuropathy symptoms  Current with eye exam  Hypertension blood pressure stable on current regimen  creatinine 0 85  Electrolytes normal  Hgb 16 0  Hyperlipidemia on Pravastatin 80 mg daily  lipid profile cholesterol 155  Triglycerides 115  HDL 62  LDL 73  LFTs normal  s/p admission 08/2017 for acute on chronic diastolic CHF  pulse oximetry at home 79%  known history of COPD with use of oxygen at nighttime  Squamous cell carcinoma lung status post right upper lobectomy in 2009  work up in the hospital  CXR  BNP elevated at 2,148  CT chest no PE  moderate centrilobular emphysema  7 mm right lower lobe nodule increased in size  Scattered subcentimeter mediastinal and hilar lymph nodes  Mild cardiomegaly  No pericardial mass or effusion  Atherosclerotic changes in the coronary arteries and aorta   Echocardiogram normal left ventricular systolic function  EF 55 to 65% range  No regional wall motion abnormalities  Mild concentric hypertrophy  Grade 2 diastolic dysfunction  Mildly dilated right and left atrium  Moderate mitral regurgitation  Moderate tricuspid regurgitation  Severe pulmonary hypertension  No pericardial effusion  Labs random blood glucose 99  Electrolytes normal except for bicarbonate 33  Magnesium 1 9  Creatinine 0 5  LFTs normal  Calcium 9 5  Troponin less than 0 02  INR 3 2  CBC WBC 7,890  Hemoglobin 15 1  Platelet count 421,518  Lactic acid normal at 0 9  TSH 3 050  s/p admission 09/2016 for acute exacerbation COPD and tracheobronchitis  CXR no infiltrates  + ILD  admission labs CBC normal except for WBC 10 220  Hgb 14  3  platelets 695,981  BMP normal except for bicarb 34  K+ 4 2  creatinine 1 12  admission 11/2015 for acute on chronic diastolic CHF  CXR bilateral pleural effusions  interstitial  echocardiogram normal LV systolic function  EF 65 %  moderate TR  severe pulmonary hypertension  The following portions of the patient's history were reviewed and updated as appropriate: allergies, current medications, past family history, past medical history, past social history, past surgical history and problem list     Review of Systems   Constitutional: Positive for fatigue  Negative for appetite change, chills, fever and unexpected weight change  HENT: Negative for congestion, ear pain, rhinorrhea, sore throat and trouble swallowing  Eyes: Negative for visual disturbance  Respiratory: Negative for cough, shortness of breath and wheezing  COPD on daily Spiriva and prn Albuterol  squamous cell CA s/p RU lobectomy in 2009  she is followed by thoracic surgery  CT scan chest 11/2017 Stable pulmonary nodules measuring up to 7 mm no change from 2014  Stable left thyroid nodule  Centrilobular emphysematous changes  CT scan chest 11/2016 no evidence of recurrence   Moderate to severe centrilobular emphysema  2 pulmonary nodules seen on right pleural-based and unchanged from previous studies  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 at the level of the right main pulmonary artery    RUBY stable on CPAP  on oxygen at HS ,    Cardiovascular: Negative for chest pain, palpitations and leg swelling  PAF on Warfarin, low dose beta blocker and digoxin  Gastrointestinal: Negative for abdominal pain, blood in stool, constipation, diarrhea, nausea and vomiting  Endocrine: Negative for polydipsia and polyuria  History of left thyroid nodule biopsy x 2 negative  she is followed by endocrinology  11/2017 TSH 5 82  free T4 normal at 1 2 02/2017 TSH mildly elevated at 5 56  not on medication  02/2016 TSH 2 90  osteoporosis on Ca++ and vitamin D supplement  02/2017 vitamin D level 40  Genitourinary: Negative for difficulty urinating  Musculoskeletal: Positive for arthralgias  OA left knee  she has been using prn Tylenol for pain  s/p right THR ,    Skin: Negative for rash  Neurological: Negative for dizziness, weakness and headaches  S/p recent fall with contusion right buttock area  No head injury  No LOC  history of seizure disorder on Keppra  08/2017 Keppra level 16 normal 10 to 40   Hematological: Negative for adenopathy  Bruises/bleeds easily (on Warfarin  )  Psychiatric/Behavioral: Positive for sleep disturbance  Negative for dysphoric mood  Chronic insomnia on Trazodone         Objective:      /80 (BP Location: Left arm, Patient Position: Sitting, Cuff Size: Standard)   Pulse 60   Temp 98 7 °F (37 1 °C)   Ht 5' 4" (1 626 m)   Wt 69 8 kg (153 lb 12 8 oz)   Breastfeeding? No   BMI 26 40 kg/m²          Physical Exam   Constitutional: She is oriented to person, place, and time  She appears well-developed and well-nourished  No distress  HENT:   Head: Atraumatic     Right Ear: External ear normal    Left Ear: External ear normal    Eyes: Conjunctivae and EOM are normal  Pupils are equal, round, and reactive to light  No scleral icterus  Neck: Neck supple  No JVD present  No tracheal deviation present  Thyroid mass (left thyroid nodule  ) present  No thyromegaly present  Cardiovascular: Normal rate and normal heart sounds  An irregularly irregular rhythm present  Exam reveals no gallop  No murmur heard  Apical HR 68   Pulmonary/Chest: Effort normal and breath sounds normal  No respiratory distress  She has no wheezes  She has no rales  Abdominal: Soft  Bowel sounds are normal  She exhibits no distension and no mass  There is no tenderness  There is no rebound and no guarding  Musculoskeletal: Normal range of motion  She exhibits no edema  Lymphadenopathy:     She has no cervical adenopathy  Neurological: She is alert and oriented to person, place, and time  Skin: No rash noted  Small ecchymotic area right buttock  Nursing note and vitals reviewed

## 2018-05-17 ENCOUNTER — OFFICE VISIT (OUTPATIENT)
Dept: PULMONOLOGY | Facility: CLINIC | Age: 83
End: 2018-05-17
Payer: COMMERCIAL

## 2018-05-17 VITALS
OXYGEN SATURATION: 96 % | BODY MASS INDEX: 26.63 KG/M2 | WEIGHT: 156 LBS | HEIGHT: 64 IN | SYSTOLIC BLOOD PRESSURE: 134 MMHG | DIASTOLIC BLOOD PRESSURE: 84 MMHG | HEART RATE: 86 BPM

## 2018-05-17 DIAGNOSIS — G47.33 OBSTRUCTIVE SLEEP APNEA ON CPAP: ICD-10-CM

## 2018-05-17 DIAGNOSIS — J41.8 MIXED SIMPLE AND MUCOPURULENT CHRONIC BRONCHITIS (HCC): Primary | ICD-10-CM

## 2018-05-17 DIAGNOSIS — R09.02 HYPOXIA: ICD-10-CM

## 2018-05-17 DIAGNOSIS — C34.91 SQUAMOUS CELL CARCINOMA OF RIGHT LUNG (HCC): ICD-10-CM

## 2018-05-17 DIAGNOSIS — Z99.89 OBSTRUCTIVE SLEEP APNEA ON CPAP: ICD-10-CM

## 2018-05-17 DIAGNOSIS — I27.20 PULMONARY HYPERTENSION (HCC): ICD-10-CM

## 2018-05-17 PROCEDURE — 94060 EVALUATION OF WHEEZING: CPT | Performed by: PHYSICIAN ASSISTANT

## 2018-05-17 PROCEDURE — 99214 OFFICE O/P EST MOD 30 MIN: CPT | Performed by: PHYSICIAN ASSISTANT

## 2018-05-17 RX ORDER — IPRATROPIUM BROMIDE AND ALBUTEROL SULFATE 2.5; .5 MG/3ML; MG/3ML
3 SOLUTION RESPIRATORY (INHALATION)
Status: DISCONTINUED | OUTPATIENT
Start: 2018-05-17 | End: 2018-05-17

## 2018-05-17 RX ORDER — IPRATROPIUM BROMIDE AND ALBUTEROL SULFATE 2.5; .5 MG/3ML; MG/3ML
3 SOLUTION RESPIRATORY (INHALATION) ONCE
Status: COMPLETED | OUTPATIENT
Start: 2018-05-17 | End: 2018-05-17

## 2018-05-17 RX ADMIN — IPRATROPIUM BROMIDE AND ALBUTEROL SULFATE 3 ML: 2.5; .5 SOLUTION RESPIRATORY (INHALATION) at 15:02

## 2018-05-17 NOTE — PROGRESS NOTES
Assessment:    1  Mixed simple and mucopurulent chronic bronchitis (HCC)  Umeclidinium-Vilanterol (ANORO ELLIPTA) 62 5-25 MCG/INH AEPB    POCT spirometry    ipratropium-albuterol (DUO-NEB) 0 5-2 5 mg/3 mL inhalation solution 3 mL    DISCONTINUED: ipratropium-albuterol (DUO-NEB) 0 5-2 5 mg/3 mL inhalation solution 3 mL   2  Pulmonary hypertension ([address] )     3  Hypoxia     4  Obstructive sleep apnea on CPAP     5  Squamous cell carcinoma of right lung [facility])         Plan: 80 y o  female former smoker with about a 30 pack year smoking history who quit at the age of 48 with significant PMH of squamous cell carcinoma of the right lung status post lobectomy in 2009, CHF, COPD, A Fib, HTN, HLD, and CVA in 2009 who presents for f/u of SOB  1   COPD with FEV1 of 76%- repeat spirometry in office with FEV1 of 76% or 1 26L compared to prior FEV1 of 83%  Effort is questionable when reviewed L/s chart  Reviewed results in detail with patient and family  Patient states she feels better with the Anoro and is able to use the inhaler better  Will send in Rx for Anoro to continue to use 1 puff once daily  Continue with rescue inhaler and nebulizer machine as needed  Continue to use Mucinex/flutter valve with increased mucus congestion  Continue to monitor pulse ox   6 min walk test completed last year demonstrated desaturation to 85%  Patient was advised to use her oxygen at 2 L via nasal cannula with long walks and nocturnally  She is fairly noncompliant with this and only uses the oxygen at night  2   Pulmonary HTN - Hypoxia 2/2 Pulmonary HTN and COPD  Estimated peak PA pressure of 60 mm Hg on last ECHO with LA and RA dilation  Normal RV size and function  Given patient is symptomatically stable in regards to her ALFARO with no complaints + advanced age, will hold off on RHC  Patient is in agreement  2   RUBY on CPAP -continue to use CPAP nightly with 2 L of oxygen via nasal cannula  3    Squamous cell carcinoma of the lung - will need repeat CT of chest in November 2018, if patient would like to continue screening given age and length of time since lobectomy  Patient has order from thoracic surgery office but will f/u with us for the results  F/u in 6 months or sooner if needed    Subjective:     Patient ID: Zhanna Moore is a 80 y o  female  Chief Complaint: SOB    HPI  80 y o  female former smoker with about a 30 pack year smoking history who quit at the age of 48 with significant PMH of squamous cell carcinoma of the right lung status post lobectomy in 2009, CHF, COPD, A Fib, HTN, HLD, and CVA in 2009 who presents for f/u of SOB  Overall, patient has been doing well  She denies any hospitalizations  Denies any exacerbations of her COPD  She states she has been using the Anoro/switching off with the Spiriva and likes the Anoro better  She feels that her breathing is more stable when she uses this  She rarely has to use her rescue inhaler  She will use her nebulizer once every couple weeks  She only takes the Mucinex/uses the flutter valve occasionally with increased mucus production  She does admit to a recent mechanical fall about 1 month ago  States she was on a chair reaching up for something on a higher shelf and fell down  They decided not to take her to the ED as she seemed fine and did not hit her head  Patient states she had a large bruise on the right side of her back  However, since then it has resolved  Review of Systems  Review of Systems   Constitution: Negative for chills, decreased appetite, fever, malaise/fatigue and weight gain  HENT: Negative for congestion  Eyes: Negative for visual disturbance  Cardiovascular: Negative for dyspnea on exertion, leg swelling and orthopnea  Respiratory: Positive for cough, shortness of breath and sputum production  Negative for sleep disturbances due to breathing and wheezing  Hematologic/Lymphatic: Negative for adenopathy     Skin: Negative for rash    Musculoskeletal: Negative for muscle weakness  Gastrointestinal: Negative for abdominal pain, diarrhea, nausea and vomiting  Neurological: Negative for excessive daytime sleepiness  Psychiatric/Behavioral: Negative for altered mental status and hallucinations  Allergic/Immunologic: Negative for environmental allergies  Objective:  /84   Pulse 86   Ht 5' 4" (1 626 m)   Wt 70 8 kg (156 lb)   SpO2 96%   BMI 26 78 kg/m²     Physical Exam   Constitutional: She is oriented to person, place, and time  She appears well-developed and well-nourished  HENT:   Head: Normocephalic and atraumatic  Neck: Normal range of motion  Cardiovascular: Normal rate and regular rhythm  No murmur heard  Pulmonary/Chest: Effort normal and breath sounds normal    Abdominal: Soft  There is no tenderness  Musculoskeletal: Normal range of motion  She exhibits edema  She exhibits no tenderness  Trace bilateral pitting edema with varicosities   Lymphadenopathy:     She has no cervical adenopathy  Neurological: She is alert and oriented to person, place, and time  Skin: Skin is warm and dry  Psychiatric: She has a normal mood and affect  Her behavior is normal    Nursing note and vitals reviewed  Lab/Image Review: Reviewed all pertinent labs/radiology results       Past Medical History:   Diagnosis Date    A-fib ([facility] )     Actinic keratosis     Last Assessed:11/6/15    Anemia     Last Assessed:6/20/13    Cardiac disease     Cataract, right eye     Last Assessed:7/21/15    CHF (congestive heart failure) (HCC)     CHF, acute on chronic (HCC) 9/8/2016    Chronic bronchitis (HCC)     COPD (chronic obstructive pulmonary disease) (HCC)     Diabetes mellitus [facility]     Disease of thyroid gland     Hyperlipidemia     Hypertension     Hypoxia     Lung cancer [facility]     Lung nodule     Pneumonia     over 50 years ago    Pulmonary embolism ([facility]     Pulmonary hypertension ([facility]     Respiratory failure (HCC)     Sleep apnea     SOB (shortness of breath)     Squamous cell carcinoma of bronchus of right lung (HCC)     Stroke (HCC)     per [employer]: without residual deficits       Social History   Substance Use Topics    Smoking status: Former Smoker     Quit date: 1983    Smokeless tobacco: Never Used      Comment: "Many years ago" 1PPD    Alcohol use No       Family History   Problem Relation Age of Onset    Diabetes Sister      Family history of coronary disease    Diabetes Mother     Heart disease Mother     Cancer Father     Stroke Family      CVA    Hypertension Family     Sudden death Family      Sudden Cardiac death       Patient Active Problem List   Diagnosis    [facility]    Essential hypertension    Type 2 diabetes mellitus without complication, without long-term current use of insulin (HCC)    Constipation    COPD (chronic obstructive pulmonary disease) (HCC)    Hypoxia    CPAP (continuous positive airway pressure) dependence    Lung nodule    Hypothyroid    Mixed hyperlipidemia    Menopausal osteoporosis    Non-toxic multinodular goiter    Chronic diastolic congestive heart failure (HCC)    Chronic hypoxemic respiratory failure (HCC)    Depression    Fatty liver    Insomnia    Mitral regurgitation    Nontoxic single thyroid nodule    Obstructive sleep apnea on CPAP    Osteoporosis    Pulmonary hypertension (HCC)    Seizure disorder (HCC)    Squamous cell carcinoma of right lung (HCC)    Labial cyst    Urge incontinence of urine

## 2018-08-21 ENCOUNTER — DOCUMENTATION (OUTPATIENT)
Dept: FAMILY MEDICINE CLINIC | Facility: CLINIC | Age: 83
End: 2018-08-21

## 2018-08-21 ENCOUNTER — ANTICOAG VISIT (OUTPATIENT)
Dept: FAMILY MEDICINE CLINIC | Facility: CLINIC | Age: 83
End: 2018-08-21